# Patient Record
Sex: FEMALE | Race: OTHER | Employment: UNEMPLOYED | URBAN - METROPOLITAN AREA
[De-identification: names, ages, dates, MRNs, and addresses within clinical notes are randomized per-mention and may not be internally consistent; named-entity substitution may affect disease eponyms.]

---

## 2017-11-27 ENCOUNTER — OFFICE VISIT (OUTPATIENT)
Dept: ORTHOPEDIC SURGERY | Age: 57
End: 2017-11-27

## 2017-11-27 VITALS
HEART RATE: 68 BPM | OXYGEN SATURATION: 99 % | TEMPERATURE: 98.3 F | SYSTOLIC BLOOD PRESSURE: 125 MMHG | HEIGHT: 63 IN | BODY MASS INDEX: 23.57 KG/M2 | RESPIRATION RATE: 18 BRPM | WEIGHT: 133 LBS | DIASTOLIC BLOOD PRESSURE: 54 MMHG

## 2017-11-27 DIAGNOSIS — M79.2 NEURITIS: ICD-10-CM

## 2017-11-27 DIAGNOSIS — M50.30 DDD (DEGENERATIVE DISC DISEASE), CERVICAL: ICD-10-CM

## 2017-11-27 DIAGNOSIS — M54.2 NECK PAIN: Primary | ICD-10-CM

## 2017-11-27 DIAGNOSIS — M47.816 LUMBAR FACET ARTHROPATHY: ICD-10-CM

## 2017-11-27 DIAGNOSIS — M62.838 MUSCLE SPASM: ICD-10-CM

## 2017-11-27 DIAGNOSIS — M54.50 LUMBAR SPINE PAIN: ICD-10-CM

## 2017-11-27 DIAGNOSIS — M79.18 MYOFASCIAL PAIN: ICD-10-CM

## 2017-11-27 RX ORDER — IBUPROFEN 200 MG
TABLET ORAL
COMMUNITY
End: 2018-02-08 | Stop reason: ALTCHOICE

## 2017-11-27 RX ORDER — GABAPENTIN 300 MG/1
CAPSULE ORAL
Qty: 30 CAP | Refills: 1 | Status: SHIPPED | OUTPATIENT
Start: 2017-11-27 | End: 2018-02-08 | Stop reason: ALTCHOICE

## 2017-11-27 RX ORDER — IBUPROFEN 600 MG/1
TABLET ORAL
Qty: 60 TAB | Refills: 1 | Status: SHIPPED | OUTPATIENT
Start: 2017-11-27 | End: 2018-02-08 | Stop reason: ALTCHOICE

## 2017-11-27 NOTE — MR AVS SNAPSHOT
Visit Information Date & Time Provider Department Dept. Phone Encounter #  
 11/27/2017  9:00 AM Cookie Tavarez, 27 [facility] [phone] [id number] Follow-up Instructions Return in about 1 month (around 12/27/2017) for PT follow up. Routing History Upcoming Health Maintenance Date Due Hepatitis C Screening 1960 DTaP/Tdap/Td series (1 - Tdap) 7/23/1981 PAP AKA CERVICAL CYTOLOGY 7/23/1981 BREAST CANCER SCRN MAMMOGRAM 7/23/2010 FOBT Q 1 YEAR AGE 50-75 7/23/2010 Influenza Age 5 to Adult 8/1/2017 Allergies as of 11/27/2017  Review Complete On: 11/27/2017 By: Cookie Tavarez MD  
 No Known Allergies Current Immunizations  Reviewed on 9/15/2016 No immunizations on file. Not reviewed this visit You Were Diagnosed With   
  
 Codes Comments Neck pain    -  Primary ICD-10-CM: M54.2 ICD-9-CM: 723.1 Lumbar spine pain     ICD-10-CM: M54.5 ICD-9-CM: 724.2 DDD (degenerative disc disease), cervical     ICD-10-CM: M50.30 ICD-9-CM: 722.4 Muscle spasm     ICD-10-CM: D26.224 ICD-9-CM: 728.85 Myofascial pain     ICD-10-CM: M79.1 ICD-9-CM: 729.1 Lumbar facet arthropathy     ICD-10-CM: M12.88 ICD-9-CM: 721.3 Neuritis     ICD-10-CM: M79.2 ICD-9-CM: 729.2 Vitals BP Pulse Temp Resp Height(growth percentile) Weight(growth percentile) 125/54 68 98.3 °F (36.8 °C) 18 5' 2.5\" (1.588 m) 133 lb (60.3 kg) SpO2 BMI OB Status Smoking Status 99% 23.94 kg/m2 Unknown Never Smoker BMI and BSA Data Body Mass Index Body Surface Area  
 23.94 kg/m 2 1.63 m 2 Preferred Pharmacy Pharmacy Name Phone [address], [facility], [address] [place] [address] Avenue [phone] Your Updated Medication List  
  
   
This list is accurate as of: 11/27/17  9:56 AM.  Always use your most recent med list.  
  
  
  
  
 gabapentin 300 mg capsule Commonly known as:  NEURONTIN Take 1 in the evening after dinner  Indications: NEUROPATHIC PAIN  
  
 * ibuprofen 200 mg tablet Commonly known as:  MOTRIN Take  by mouth. * ibuprofen 600 mg tablet Commonly known as:  MOTRIN Take 1 po bid with food as needed for pain  Indications: Pain * Notice: This list has 2 medication(s) that are the same as other medications prescribed for you. Read the directions carefully, and ask your doctor or other care provider to review them with you. Prescriptions Sent to Pharmacy Refills  
 gabapentin (NEURONTIN) 300 mg capsule 1 Sig: Take 1 in the evening after dinner  Indications: NEUROPATHIC PAIN Class: Normal  
 Pharmacy: [address], [facility] , [address] [place] [address] Ph #: [phone]  
 ibuprofen (MOTRIN) 600 mg tablet 1 Sig: Take 1 po bid with food as needed for pain  Indications: Pain Class: Normal  
 Pharmacy: [address], [facility] , [address] [place] [address] Ph #: [phone] We Performed the Following AMB POC XRAY, SPINE, CERVICAL; 2 OR 3 [45535 CPT(R)] AMB POC XRAY, SPINE, LUMBOSACRAL; 4+ [id number] CPT(R)] REFERRAL TO PHYSICAL THERAPY [KBC51 Custom] Comments:  
 Evaluate and Treat Lumbar and Cervical Therapy 2-3 x week for 4 weeks Follow-up Instructions Return in about 1 month (around 12/27/2017) for PT follow up. Referral Information Referral ID Referred By Referred To  
  
 [id number] Renella Blizzard [facility]. [address], [address] [place], [address] [address] Phone: [phone] Visits Status Start Date End Date 1 New Request 11/27/17 11/27/18 If your referral has a status of pending review or denied, additional information will be sent to support the outcome of this decision. Patient Instructions Neck Arthritis: Exercises Your Care Instructions Here are some examples of typical rehabilitation exercises for your condition. Start each exercise slowly. Ease off the exercise if you start to have pain. Your doctor or physical therapist will tell you when you can start these exercises and which ones will work best for you. How to do the exercises Neck stretches to the side 1. This stretch works best if you keep your shoulder down as you lean away from it. To help you remember to do this, start by relaxing your shoulders and lightly holding on to your thighs or your chair. 2. Tilt your head toward your shoulder and hold for 15 to 30 seconds. Let the weight of your head stretch your muscles. 3. Repeat 2 to 4 times toward each shoulder. Chin tuck 1. Lie on the floor with a rolled-up towel under your neck. Your head should be touching the floor. 2. Slowly bring your chin toward your chest. 
3. Hold for a count of 6, and then relax for up to 10 seconds. 4. Repeat 8 to 12 times. Active cervical rotation 1. Sit in a firm chair, or stand up straight. 2. Keeping your chin level, turn your head to the right, and hold for 15 to 30 seconds. 3. Turn your head to the left and hold for 15 to 30 seconds. 4. Repeat 2 to 4 times to each side. Shoulder blade squeeze 1. While standing, squeeze your shoulder blades together. 2. Do not raise your shoulders up as you are squeezing. 3. Hold for 6 seconds. 4. Repeat 8 to 12 times. Shoulder rolls 1. Sit comfortably with your feet shoulder-width apart. You can also do this exercise standing up. 2. Roll your shoulders up, then back, and then down in a smooth, circular motion. 3. Repeat 2 to 4 times. Follow-up care is a key part of your treatment and safety. Be sure to make and go to all appointments, and call your doctor if you are having problems. It's also a good idea to know your test results and keep a list of the medicines you take. Where can you learn more? Go to http://bella-camila.info/. Enter J484 in the search box to learn more about \"Neck Arthritis: Exercises. \" Current as of: March 21, 2017 Content Version: 11.4 © 3421-6510 [employer]. Care instructions adapted under license by [employer] (which disclaims liability or warranty for this information). If you have questions about a medical condition or this instruction, always ask your healthcare professional. [place] [address] any warranty or liability for your use of this information. Low Back Arthritis: Exercises Your Care Instructions Here are some examples of typical rehabilitation exercises for your condition. Start each exercise slowly. Ease off the exercise if you start to have pain. Your doctor or physical therapist will tell you when you can start these exercises and which ones will work best for you. When you are not being active, find a comfortable position for rest. Some people are comfortable on the floor or a medium-firm bed with a small pillow under their head and another under their knees. Some people prefer to lie on their side with a pillow between their knees. Don't stay in one position for too long. Take short walks (10 to 20 minutes) every 2 to 3 hours. Avoid slopes, hills, and stairs until you feel better. Walk only distances you can manage without pain, especially leg pain. How to do the exercises Pelvic tilt 4. Lie on your back with your knees bent. 5. \"Brace\" your stomach-tighten your muscles by pulling in and imagining your belly button moving toward your spine. 6. Press your lower back into the floor. You should feel your hips and pelvis rock back. 7. Hold for 6 seconds while breathing smoothly. 8. Relax and allow your pelvis and hips to rock forward. 9. Repeat 8 to 12 times. Back stretches 5. Get down on your hands and knees on the floor. 6. Relax your head and allow it to droop. Round your back up toward the ceiling until you feel a nice stretch in your upper, middle, and lower back. Hold this stretch for as long as it feels comfortable, or about 15 to 30 seconds. 7. Return to the starting position with a flat back while you are on your hands and knees. 8. Let your back sway by pressing your stomach toward the floor. Lift your buttocks toward the ceiling. 9. Hold this position for 15 to 30 seconds. 10. Repeat 2 to 4 times. Follow-up care is a key part of your treatment and safety. Be sure to make and go to all appointments, and call your doctor if you are having problems. It's also a good idea to know your test results and keep a list of the medicines you take. Where can you learn more? Go to http://bella-camial.info/. Enter A054 in the search box to learn more about \"Low Back Arthritis: Exercises. \" Current as of: March 21, 2017 Content Version: 11.4 © 5817-1367 [employer]. Care instructions adapted under license by [employer] (which disclaims liability or warranty for this information). If you have questions about a medical condition or this instruction, always ask your healthcare professional. [address] any warranty or liability for your use of this information. Introducing [facility]! Dear Mike Kaene: Thank you for requesting a [employer] account. Our records indicate that you already have an active [employer] account. You can access your account anytime at [email or web address] Did you know that you can access your hospital and ER discharge instructions at any time in [employer]? You can also review all of your test results from your hospital stay or ER visit. Additional Information If you have questions, please visit the Frequently Asked Questions section of the [employer] website at [email or web address] Remember, MyChart is NOT to be used for urgent needs. For medical emergencies, dial 911. Now available from your iPhone and Android! Please provide this summary of care documentation to your next provider. Your primary care clinician is listed as Siena Fuchs. If you have any questions after today's visit, please call [phone].

## 2017-11-27 NOTE — PATIENT INSTRUCTIONS

## 2017-11-27 NOTE — PROGRESS NOTES
[facility]  [address] [place] [address], [facility] [address]  Phone: [phone]  Fax: [phone]    NEW PATIENT    ASSESSMENT   Diagnoses and all orders for this visit:    1. Neck pain  -     [04484] C Spine 2-3V  -     ibuprofen (MOTRIN) 600 mg tablet; Take 1 po bid with food as needed for pain  Indications: Pain  -     REFERRAL TO PHYSICAL THERAPY    2. Lumbar spine pain  -     [02828] LS Spine 4V  -     ibuprofen (MOTRIN) 600 mg tablet; Take 1 po bid with food as needed for pain  Indications: Pain  -     REFERRAL TO PHYSICAL THERAPY    3. DDD (degenerative disc disease), cervical  -     REFERRAL TO PHYSICAL THERAPY    4. Muscle spasm  -     REFERRAL TO PHYSICAL THERAPY    5. Myofascial pain  -     REFERRAL TO PHYSICAL THERAPY    6. Lumbar facet arthropathy  -     ibuprofen (MOTRIN) 600 mg tablet; Take 1 po bid with food as needed for pain  Indications: Pain  -     REFERRAL TO PHYSICAL THERAPY    7. Neuritis  -     gabapentin (NEURONTIN) 300 mg capsule; Take 1 in the evening after dinner  Indications: NEUROPATHIC PAIN  -     REFERRAL TO PHYSICAL THERAPY       IMPRESSION AND PLAN:  Elon Buerger is a 62 y.o. right hand dominant female with history of neck and lower back pain. She has experienced progressive neck pain for about 10 years and progressively lower back pain x 5 years. Pt experiences pain radiating down the right arm to the hand and down both legs to the feet. She has tried ibuprofen 400 mg QHS and has received chiropractic treatment. 1) Pt was given information on cervical and lumbar arthritis exercises. 2) She was referred to cervical and lumbar physical therapy. 3) Pt was prescribed ibuprofen 600 mg 1 tab BID prn pain. 4) She was prescribed Neurontin 300 mg 1 tab QHS. 5) Discussed obtaining a lumbar MRI if needed. 6) Ms. Virgie Grove has a reminder for a \"due or due soon\" health maintenance.  I have asked that she contact her primary care provider, Ziggy Abrams MD, for follow-up on this health maintenance. 7)  demonstrated consistency with prescribing. 8) Pt will follow-up in 4-6 weeks or sooner if needed. HISTORY OF PRESENT ILLNESS:  Anjel Liang is a 62 y.o. right hand dominant female with history of neck and lower back pain. Pt presents to the office today as a self referred new patient. She has experienced neck pain for about 10 years and admits that it has progressively worsened. 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 She admits to pain radiating down the right arm to the hand. Pt also admits to weakness in the arms but denies any leg weakness at this time. Pt complains of aching lower back pain x 5 years. Her lower back pain radiates down the right leg to the foot that is worse at night, interfering with her sleep. Pt admits that her lower back pain progressed and she has recently started experiencing aching/tingling pain radiating down the left leg to the foot. Her lower back and leg pain is worse when sitting. She has not tried physical therapy but has received chiropractic treatment. Pt states she has been using ibuprofen 200 mg 2 tabs QHS as needed with benefit. Pt at this time desires to proceed with physical therapy and medication evaluation. Of note, patient had a laparoscopic hysterectomy last year. She will start a new job for a [employer] in [place] this afternoon. Pain Scale: 8/10     PCP: Ziggy Abrams MD      History reviewed. No pertinent past medical history. Social History     Social History    Marital status:      Spouse name: N/A    Number of children: N/A    Years of education: N/A     Occupational History    Not on file.      Social History Main Topics    Smoking status: Never Smoker    Smokeless tobacco: Never Used    Alcohol use No    Drug use: No    Sexual activity: Not on file     Other Topics Concern  Not on file     Social History Narrative           No Known Allergies    REVIEW OF SYSTEMS    Constitutional: Negative for fever, chills, or weight change. Respiratory: Negative for cough or shortness of breath. Cardiovascular: Negative for chest pain or palpitations. Gastrointestinal: Negative for acid reflux, change in bowel habits, or constipation. Genitourinary: Negative for dysuria and flank pain. Musculoskeletal: Positive for cervical and lumbar pain. Skin: Negative for rash. Neurological: Negative for headaches, dizziness, or numbness. Endo/Heme/Allergies: Negative for increased bruising. Psychiatric/Behavioral: Positive for difficulty with sleep. PHYSICAL EXAMINATION  Visit Vitals    /54    Pulse 68    Temp 98.3 °F (36.8 °C)    Resp 18    Ht 5' 2.5\" (1.588 m)    Wt 133 lb (60.3 kg)    SpO2 99%    BMI 23.94 kg/m2       Constitutional: Awake, alert, and in no acute distress. HEENT: Normocephalic. Atraumatic. Oropharynx is moist and clear. PERRL. EOMI. Sclerae are nonicteric  Heart: Regular rate and rhythm  Lungs: Clear to auscultation bilaterally  Abdomen: Soft and nontender. Bowel sounds are present  Neurological: 1+ symmetrical DTRs in the upper extremities. 1+ symmetrical DTRs in the lower extremities. Sensation to light touch is intact. Negative Arlette's sign bilaterally. Skin: warm, dry, and intact. Musculoskeletal: Decreased range of motion and pain with side to side cervical flexion. Good range of motion with cervical extension and forward flexion. Very tight across the upper trapezius with multiple trigger points. Minimal tenderness to palpation in the lower lumbar region and over the sacroiliac joints. No pain with extension, axial loading, or forward flexion. Mild pain with internal rotation of her right hip. Positive straight leg raise on the right.       Biceps  Triceps Deltoids Wrist Ext Wrist Flex Hand Intrin   Right 4/5 4/5 4/5 4/5 4/5 4/5   Left +4/5 +4/5 +4/5 +4/5 +4/5 +4/5      Hip Flex  Quads Hamstrings Ankle DF EHL Ankle PF   Right 4/5 4/5 4/5 4/5 4/5 4/5   Left +4/5 +4/5 +4/5 +4/5 +4/5 +4/5     IMAGING:    Cervical spine 2V x-rays from 11/27/2017 were personally reviewed and demonstrated:  Degenerative discs at C4, C5, and C6. Degenerative facets. Lumbar spine 4V x-rays from 11/27/2017 were personally reviewed and demonstrated:  Mild degenerative joint findings in both hips. Multilevel degenerative facets. Mild degenerative disc disease. Lithesis at L5-S1    Written by Adeel Brown, as dictated by Tyson Espinosa MD.  I, Dr. Tyson Espinosa confirm that all documentation is accurate.

## 2017-12-11 ENCOUNTER — HOSPITAL ENCOUNTER (OUTPATIENT)
Dept: PHYSICAL THERAPY | Age: 57
Discharge: HOME | End: 2017-12-11
Payer: COMMERCIAL

## 2017-12-11 PROCEDURE — 97110 THERAPEUTIC EXERCISES: CPT

## 2017-12-11 PROCEDURE — 97161 PT EVAL LOW COMPLEX 20 MIN: CPT

## 2017-12-11 NOTE — PROGRESS NOTES
[facility]  [address]  [place], [address]  [phone] fax    Plan of Care/ Statement of Necessity for Physical Therapy Services    Patient name: Jaky Ye Start of Care: 2017   Referral source: Lila Rasmussen MD : 1960    Medical Diagnosis: Low back pain [M54.5]  Neck pain [M54.2]   Onset Date:10 years ago cervical, 5 years ago lumbar    Treatment Diagnosis: Mechanical cervical/lumbar pain   Prior Hospitalization: see medical history Provider#: [id number]   Medications: Verified on Patient summary List    Comorbidities:    Prior Level of Function: The patient states that she has had pain with prolonged sitting and sleeping at night with her low back. The Plan of Care and following information is based on the information from the initial evaluation. Assessment/ key information: The patient is a 62year old right handed female with a chief complaint of cervical pain and lumbar pain which are both chronic in nature. She states that her neck has improved recently since she has avoided sleeping on her stomach per her chiropractor and has been sleeping with a pillow which wraps around her neck. She states her back and pain that extends down her right leg is most bothersome currently which is painful during prolonged sitting and occasionally at night. The patient has had recent radiographs of her neck and back, but is unsure of specific results. She presents with signs and symptoms consistent with mechanical neck pain with negative Spurlings, compression testing, dermatomal, and myotomal testing, however she does present with decreased dural mobility and positive SLR and slump testing, but negative myotomal and reflex testing. She has impairments consisting of pain, decreased ROM, decreased flexibility, decreased core and scap strength, decreased ADL efficiency, and limited ADL ease.  The patient will benefit from skilled PT in order to address the aforementioned impairments. Evaluation Complexity History LOW Complexity : Zero comorbidities / personal factors that will impact the outcome / POC; Examination MEDIUM Complexity : 3 Standardized tests and measures addressing body structure, function, activity limitation and / or participation in recreation  ;Presentation MEDIUM Complexity : Evolving with changing characteristics  ; Clinical Decision Making MEDIUM Complexity : FOTO score of 26-74  Overall Complexity Rating: LOW   Problem List: pain affecting function, decrease ROM, decrease strength, decrease ADL/ functional abilitiies and decrease activity tolerance   Treatment Plan may include any combination of the following: Therapeutic exercise, Therapeutic activities, Neuromuscular re-education, Physical agent/modality, Manual therapy and Patient education  Patient / Family readiness to learn indicated by: asking questions, trying to perform skills and interest  Persons(s) to be included in education: patient (P)  Barriers to Learning/Limitations: None  Patient Goal (s): stop the problem  Patient Self Reported Health Status: good  Rehabilitation Potential: good    Short Term Goals: To be accomplished in 2 weeks:   1. The patient will be independent and compliant with HEP to maximize therapeutic benefit. 2. The patient will improve cervical rotation to 55 degrees bilaterally to improve ease of driving. Long Term Goals: To be accomplished in 4 weeks:    1. The patient will improve lumbar FOTO score to 71 to improve ADL ease. 2. The patient will improve cervical FOTO score to 60 to maximize ADL ease. 3. The patient will present with dural 90/90 HS mobility of 30 degrees to improve ease of chores. 4. The patient will report abolishment of R LE pain to improve ease of occupational tasks. 5. The patient will improve cervical chin tuck and lift to 30\" without signs of fatigue to improve ease of sitting.     Frequency / Duration: Patient to be seen 2 times per week for 4 weeks. Patient/ Caregiver education and instruction: Diagnosis, prognosis, self care, activity modification and exercises   [x]  Plan of care has been reviewed with ALESHIA Queen, PT 12/11/2017 9:50 AM    ________________________________________________________________________    I certify that the above Therapy Services are being furnished while the patient is under my care. I agree with the treatment plan and certify that this therapy is necessary.     [de-identified] Signature:____________________  Date:____________Time: _________    Please sign and return to [facility] [address] [place] [address], [facility] [address]  [phone] fax

## 2017-12-11 NOTE — PROGRESS NOTES
PT DAILY TREATMENT NOTE     Patient Name: Flower Lan  Date:2017  : 1960  [x]  Patient  Verified  Payor: Rene Elizondo / Plan: [employer] / Product Type: HMO /    In time:9:08  Out time:9:50  Total Treatment Time (min): 42  Visit #: 1 of 8    Treatment Area: Low back pain [M54.5]  Neck pain [M54.2]    SUBJECTIVE  Pain Level (0-10 scale): 2/10  Any medication changes, allergies to medications, adverse drug reactions, diagnosis change, or new procedure performed?: [x] No    [] Yes (see summary sheet for update)  Subjective functional status/changes:   [] No changes reported  The patient has a chief complaint of cervical pain with occasional R UE pain, and LBP with more consistent R LE pain that is chronic but gradually increasing lately. OBJECTIVE  32 min [x]Eval                  []Re-Eval       10 min Therapeutic Exercise:  [] See flow sheet :   Rationale: increase ROM and increase strength to improve the patients ability to improve ADL ease. With   [] TE   [] TA   [] neuro   [] other: Patient Education: [x] Review HEP    [] Progressed/Changed HEP based on:   [] positioning   [] body mechanics   [] transfers   [] heat/ice application    [] other:      Other Objective/Functional Measures: See IE     Pain Level (0-10 scale) post treatment: 210    ASSESSMENT/Changes in Function: See POC. Patient will continue to benefit from skilled PT services to modify and progress therapeutic interventions, address functional mobility deficits, address ROM deficits, address strength deficits, analyze and address soft tissue restrictions, analyze and cue movement patterns, analyze and modify body mechanics/ergonomics, assess and modify postural abnormalities and instruct in home and community integration to attain remaining goals.      [x]  See Plan of Care  []  See progress note/recertification  []  See Discharge Summary         Progress towards goals / Updated goals:  Short Term Goals: To be accomplished in 2 weeks:                         1. The patient will be independent and compliant with HEP to maximize therapeutic benefit. 2. The patient will improve cervical rotation to 55 degrees bilaterally to improve ease of driving. Long Term Goals: To be accomplished in 4 weeks:                          1. The patient will improve lumbar FOTO score to 71 to improve ADL ease. 2. The patient will improve cervical FOTO score to 60 to maximize ADL ease. 3. The patient will present with dural 90/90 HS mobility of 30 degrees to improve ease of chores. 4. The patient will report abolishment of R LE pain to improve ease of occupational tasks. 5. The patient will improve cervical chin tuck and lift to 30\" without signs of fatigue to improve ease of sitting.     PLAN  []  Upgrade activities as tolerated     [x]  Continue plan of care  []  Update interventions per flow sheet       []  Discharge due to:_  []  Other:_      Kents Store Postal, PT 12/11/2017  10:27 AM    Future Appointments  Date Time Provider Alon Barragani   12/15/2017 2:00 PM Skinny Zimmerman [facility]   12/18/2017 9:00 AM Lynne Christianio [facility]   12/22/2017 1:00 PM Lynneramsey Christianio [facility]   12/27/2017 9:30 AM Scot Allen [facility]   12/29/2017 1:00 PM Lynne Allen [facility]   1/2/2018 10:00 AM Kents Store Postal, PT [facility]   1/4/2018 3:00 PM Kents Store Postal, PT [facility]   1/10/2018 8:30 AM Prachi Page  [address]

## 2017-12-15 ENCOUNTER — APPOINTMENT (OUTPATIENT)
Dept: PHYSICAL THERAPY | Age: 57
End: 2017-12-15
Payer: COMMERCIAL

## 2017-12-18 ENCOUNTER — HOSPITAL ENCOUNTER (OUTPATIENT)
Dept: PHYSICAL THERAPY | Age: 57
Discharge: HOME | End: 2017-12-18
Payer: COMMERCIAL

## 2017-12-18 PROCEDURE — 97112 NEUROMUSCULAR REEDUCATION: CPT

## 2017-12-18 PROCEDURE — 97110 THERAPEUTIC EXERCISES: CPT

## 2017-12-18 NOTE — PROGRESS NOTES
PT DAILY TREATMENT NOTE 3-16    Patient Name: Serenity Mccullough  Date:2017  : 1960  [x]  Patient  Verified  Payor: Pillo Swift / Plan: [employer] / Product Type: HMO /    In time:9:08  Out time:9:42  Total Treatment Time (min): 34  Visit #: 2 of 8    Treatment Area: Low back pain [M54.5]  Neck pain [M54.2]    SUBJECTIVE  Pain Level (0-10 scale): 6  Any medication changes, allergies to medications, adverse drug reactions, diagnosis change, or new procedure performed?: [x] No    [] Yes (see summary sheet for update)  Subjective functional status/changes:   [] No changes reported  \"I get dizzy when I'm washing my windows. I get dizzy everytime I exercise. I have been dealing with this for years. \" Patient moved to this area in past two years, therapist encouraging patient to find primary care doctor. OBJECTIVE  24 min Therapeutic Exercise:  [x] See flow sheet :   Rationale: increase ROM, increase strength and improve coordination to improve the patients ability to increase ease with ADLs    10 min Neuromuscular Re-education:  [x]  See flow sheet :   Rationale: increase strength, improve coordination, improve balance and increase proprioception  to improve the patients ability to perform functional activities with increased ease              With   [] TE   [] TA   [] neuro   [] other: Patient Education: [x] Review HEP    [] Progressed/Changed HEP based on:   [] positioning   [] body mechanics   [] transfers   [] heat/ice application    [] other:      Other Objective/Functional Measures:   Initiated POC per flowsheet    Patient with many c/o dizziness throughout session. Negative beatrice-hallpike. Cues to maintain SA activation with QP position and avoid dumping into upper c/s extension    Pain Level (0-10 scale) post treatment: 3    ASSESSMENT/Changes in Function:   Patient with multiple c/o dizziness throughout session.  She informs therapist that she has been dealing with dizziness for years, yet has not been officially diagnosed. She reports that dizziness occurs when working out or when performing household chores as well as getting out of bed, but, states, \"I push through. I'm used to it. \" Patient with some difficulty following therapist's instructions secondary to English is secondary language. Continue per POC per patient tolerance. Patient will continue to benefit from skilled PT services to modify and progress therapeutic interventions, address functional mobility deficits, address ROM deficits, address strength deficits, analyze and address soft tissue restrictions, analyze and cue movement patterns, analyze and modify body mechanics/ergonomics and assess and modify postural abnormalities to attain remaining goals. []  See Plan of Care  []  See progress note/recertification  []  See Discharge Summary         Short Term Goals: To be accomplished in 2 weeks:                         1. The patient will be independent and compliant with HEP to maximize therapeutic benefit. 2. The patient will improve cervical rotation to 55 degrees bilaterally to improve ease of driving. Long Term Goals: To be accomplished in 4 weeks:                          1. The patient will improve lumbar FOTO score to 71 to improve ADL ease. 2. The patient will improve cervical FOTO score to 60 to maximize ADL ease. 3. The patient will present with dural 90/90 HS mobility of 30 degrees to improve ease of chores. 4. The patient will report abolishment of R LE pain to improve ease of occupational tasks. 5. The patient will improve cervical chin tuck and lift to 30\" without signs of fatigue to improve ease of sitting.     PLAN  []  Upgrade activities as tolerated     [x]  Continue plan of care  []  Update interventions per flow sheet       []  Discharge due to:_  []  Other:_ Negin Hemp 12/18/2017  9:22 AM    Future Appointments  Date Time Provider Alon Parson   12/22/2017 1:00 PM Negin Hemp [facility]   12/27/2017 9:30 AM Negin Hemp [facility]   12/29/2017 1:00 PM Negin Hemp [facility]   1/2/2018 10:00 AM Milo Beckman, PT [facility]   1/4/2018 3:00 PM Milo Beckman, PT [facility]   1/10/2018 8:30 AM [facility]eli J[facility]

## 2017-12-22 ENCOUNTER — HOSPITAL ENCOUNTER (OUTPATIENT)
Dept: PHYSICAL THERAPY | Age: 57
Discharge: HOME | End: 2017-12-22
Payer: COMMERCIAL

## 2017-12-22 PROCEDURE — 97110 THERAPEUTIC EXERCISES: CPT

## 2017-12-22 PROCEDURE — 97112 NEUROMUSCULAR REEDUCATION: CPT

## 2017-12-22 NOTE — PROGRESS NOTES
PT DAILY TREATMENT NOTE 3-16    Patient Name: Steve Abarca  Date:2017  : 1960  [x]  Patient  Verified  Payor: [employer] / Plan: [address]MO / Product Type: HMO /    In time:1:05  Out time:1:44  Total Treatment Time (min): 39  Visit #: 3 of 8    Treatment Area: Low back pain [M54.5]  Neck pain [M54.2]    SUBJECTIVE  Pain Level (0-10 scale): 1  Any medication changes, allergies to medications, adverse drug reactions, diagnosis change, or new procedure performed?: [x] No    [] Yes (see summary sheet for update)  Subjective functional status/changes:   [] No changes reported  \"I have been under a lot of stress because my  has had an infection in his leg, and he had to go to the hospital. He was in the hospital for two years. \"    Patient continues to have intermittent c/o \"dizziness\". OBJECTIVE  31 min Therapeutic Exercise:  [x] See flow sheet :   Rationale: increase ROM, increase strength and improve coordination to improve the patients ability to increase ease with ADLs    8 min Neuromuscular Re-education:  [x]  See flow sheet :   Rationale: increase ROM, increase strength, improve coordination, improve balance and increase proprioception  to improve the patients ability to improve c/s posture            With   [] TE   [] TA   [] neuro   [] other: Patient Education: [x] Review HEP    [] Progressed/Changed HEP based on:   [] positioning   [] body mechanics   [] transfers   [] heat/ice application    [] other:      Other Objective/Functional Measures:   BP: 130/90 mmHg     Oculomotor Tests: (Fixation Not Blocked)       Ocular ROM:   [x] [facility]    [] Limited    Describe:       Spontaneous Nystag. [x] Neg     [] Pos    [] Left    [] Right       Gaze Holding Nystag.  [x] Neg     [] Pos    [] Left    [] Right        Smooth Pursuit  [x] Neg     [] Pos    [] Left    [] Right        Saccades   [x] Neg     [] Pos    [] Left    [] Right        VOR - Slow Head Mvmt [x] Neg     [] Pos    [] Left    [] Right        VOR - Fast Head Mvmt [x] Neg     [] Pos    [] Left    [] Right However, did reproduce dizziness       Head Thrust  [x] Neg     [] Pos    [] Left    [] Right However, did reproduce dizziness     The patient has a component of activity and stress that she reports does provocate dizziness. She does have reports of tinnitus as well. Advised patient that she should follow up with ENT or Neurologist for these reports as she indicates she has not had this complaint assessed in years. I did indicate to the patient we can continue to treat unless her condition worsens or limits her ability to engage in exercise. Bhargavi Resendez, DPT, PT, OCS      Pain Level (0-10 scale) post treatment: 0    ASSESSMENT/Changes in Function:   Patient continues to report intermittent dizziness which subsides post rest breaks. Patient with quick c/s postural fatigue as evidenced with QP activities. Continued patient education to follow up with MD in regards to her dizzy symptoms. Patient will continue to benefit from skilled PT services to modify and progress therapeutic interventions, address functional mobility deficits, address ROM deficits, address strength deficits, analyze and address soft tissue restrictions, analyze and cue movement patterns, analyze and modify body mechanics/ergonomics and assess and modify postural abnormalities to attain remaining goals. []  See Plan of Care  []  See progress note/recertification  []  See Discharge Summary         Short Term Goals: To be accomplished in 2 weeks:                         2. The patient will be independent and compliant with HEP to maximize therapeutic benefit.progressing per patient report (12/22/2017)                         2. The patient will improve cervical rotation to 55 degrees bilaterally to improve ease of driving. Long Term Goals: To be accomplished in 4 weeks:                          1.  The patient will improve lumbar FOTO score to 71 to improve ADL ease.                         2. The patient will improve cervical FOTO score to 60 to maximize ADL ease.                         3. The patient will present with dural 90/90 HS mobility of 30 degrees to improve ease of chores.                         4. The patient will report abolishment of R LE pain to improve ease of occupational tasks.                         5. The patient will improve cervical chin tuck and lift to 30\" without signs of fatigue to improve ease of sitting.     PLAN  []  Upgrade activities as tolerated     [x]  Continue plan of care  []  Update interventions per flow sheet       []  Discharge due to:_  []  Other:_      Nita Gear 12/22/2017  1:14 PM    Future Appointments  Date Time Provider Alon Blank   12/29/2017 10:30 AM Nita Becky [facility]   1/2/2018 10:00 AM Mackenzie Oconnell, PT [facility]   1/4/2018 9:30 AM Capo Marin PTA [facility]   1/8/2018 10:00 AM Mackenzie Oconnell, PT [facility]   1/10/2018 8:30 AM Veronica Cantor  [address]

## 2017-12-27 ENCOUNTER — APPOINTMENT (OUTPATIENT)
Dept: PHYSICAL THERAPY | Age: 57
End: 2017-12-27
Payer: COMMERCIAL

## 2017-12-29 ENCOUNTER — HOSPITAL ENCOUNTER (OUTPATIENT)
Dept: PHYSICAL THERAPY | Age: 57
Discharge: HOME | End: 2017-12-29
Payer: COMMERCIAL

## 2017-12-29 PROCEDURE — 97110 THERAPEUTIC EXERCISES: CPT

## 2017-12-29 PROCEDURE — 97112 NEUROMUSCULAR REEDUCATION: CPT

## 2017-12-29 NOTE — PROGRESS NOTES
PT DAILY TREATMENT NOTE 3-16    Patient Name: Jaylene Hou  Date:2017  : 1960  [x]  Patient  Verified  Payor: Marylou Jhon / Plan: [employer] / Product Type: HMO /    In time:10:30  Out time:11:00  Total Treatment Time (min): 30  Visit #: 4 of 8    Treatment Area: Low back pain [M54.5]  Neck pain [M54.2]    SUBJECTIVE  Pain Level (0-10 scale): 0  Any medication changes, allergies to medications, adverse drug reactions, diagnosis change, or new procedure performed?: [x] No    [] Yes (see summary sheet for update)  Subjective functional status/changes:   [] No changes reported  \"I'm feeling better since starting therapy. My neck and my back that is. \" Patient continues to report episodes of dizziness. OBJECTIVE  22 min Therapeutic Exercise:  [x] See flow sheet :   Rationale: increase ROM, increase strength and improve coordination to improve the patients ability to increase ease with ADLs    8 min Neuromuscular Re-education:  [x]  See flow sheet :   Rationale: increase ROM, increase strength, improve coordination and increase proprioception  to improve the patients ability to improve c/s posture            With   [] TE   [] TA   [] neuro   [] other: Patient Education: [x] Review HEP    [] Progressed/Changed HEP based on:   [] positioning   [] body mechanics   [] transfers   [] heat/ice application    [] other:      Other Objective/Functional Measures:   C/s Rotation: R, 56 degrees. L, 50 degrees. Pain Level (0-10 scale) post treatment: 0    ASSESSMENT/Changes in Function:   Patient making progress towards initial goals. Cervical spine ROM is improving and she reports subjective improvement with no c/o pain today. Continues to report episodes of dizziness though none reported during session.     Patient will continue to benefit from skilled PT services to modify and progress therapeutic interventions, address functional mobility deficits, address ROM deficits, address strength deficits, analyze and address soft tissue restrictions, analyze and cue movement patterns, analyze and modify body mechanics/ergonomics and assess and modify postural abnormalities to attain remaining goals. []  See Plan of Care  []  See progress note/recertification  []  See Discharge Summary         Short Term Goals: To be accomplished in 2 weeks:                         4. The patient will be independent and compliant with HEP to maximize therapeutic benefit.progressing per patient report (12/22/2017)                         2. The patient will improve cervical rotation to 55 degrees bilaterally to improve ease of driving.-progressing, R, 56 degrees. L, 50 degrees (12/29/2017)  Long Term Goals: To be accomplished in 4 weeks:                          1. The patient will improve lumbar FOTO score to 71 to improve ADL ease.                         2. The patient will improve cervical FOTO score to 60 to maximize ADL ease.                         3. The patient will present with dural 90/90 HS mobility of 30 degrees to improve ease of chores.                         4. The patient will report abolishment of R LE pain to improve ease of occupational tasks.                         5. The patient will improve cervical chin tuck and lift to 30\" without signs of fatigue to improve ease of sitting.     PLAN  []  Upgrade activities as tolerated     [x]  Continue plan of care  []  Update interventions per flow sheet       []  Discharge due to:_  []  Other:_      Dulce Hernandez 12/29/2017  10:29 AM    Future Appointments  Date Time Provider Alon Parson   12/29/2017 10:30 AM Dulce Hernandez [facility]   1/2/2018 10:00 AM Nereyda Ramsay PT [facility]   1/4/2018 9:30 AM Brain Mendieta PTA [facility]   1/8/2018 10:00 AM Nereyda Ramsay PT [facility]   1/10/2018 8:30 AM Carola Lambert  [address]

## 2018-01-02 ENCOUNTER — HOSPITAL ENCOUNTER (OUTPATIENT)
Dept: PHYSICAL THERAPY | Age: 58
Discharge: HOME | End: 2018-01-02
Payer: COMMERCIAL

## 2018-01-02 PROCEDURE — 97112 NEUROMUSCULAR REEDUCATION: CPT

## 2018-01-02 PROCEDURE — 97110 THERAPEUTIC EXERCISES: CPT

## 2018-01-02 NOTE — PROGRESS NOTES
[facility]  [address]  [place], [address]  [phone] fax    Physical Therapy Discharge Summary  Patient name: Arcadio Cain Start of Care: 2017   Referral source: Jae Lott MD : 1960                          Medical Diagnosis: Low back pain [M54.5]  Neck pain [M54.2] Onset Date:10 years ago cervical, 5 years ago lumbar                          Treatment Diagnosis: Mechanical cervical/lumbar pain   Prior Hospitalization: see medical history Provider#: [id number]   Medications: Verified on Patient summary List    Comorbidities:    Prior Level of Function: The patient states that she has had pain with prolonged sitting and sleeping at night with her low back. Visits from Start of Care: 5    Missed Visits: 0  Reporting Period : 2017 to 2018    Summary of Care:  Short Term Goals: To be accomplished in 2 weeks:                         1. The patient will be independent and compliant with HEP to maximize therapeutic benefit.progressing per patient report (2017)                         2. The patient will improve cervical rotation to 55 degrees bilaterally to improve ease of driving.-progressing, R, 56 degrees. L, 50 degrees (2017)  Long Term Goals: To be accomplished in 4 weeks:                          1. The patient will improve lumbar FOTO score to 71 to improve ADL ease. Met 77 2018                         2. The patient will improve cervical FOTO score to 60 to maximize ADL ease. Met - 65 2018                         3. The patient will present with dural 90/90 HS mobility of 30 degrees to improve ease of chores. Met 20 degrees 2018                         4. The patient will report abolishment of R LE pain to improve ease of occupational tasks. Met no pain 2018                         5.  The patient will improve cervical chin tuck and lift to 30\" without signs of fatigue to improve ease of sitting. Met 60\" 1/02/2018    The patient has made excellent progress with regards to FOTO score, HS flexibility, R LE pain as well as cervical mobility. She has been discharged to continue HEP. Recommended to patient to see neuro or ENT regarding dizziness reports.   ASSESSMENT/RECOMMENDATIONS:  [x]Discontinue therapy: [x]Patient has reached or is progressing toward set goals      []Patient is non-compliant or has abdicated      []Due to lack of appreciable progress towards set [address], PT 1/2/2018 1:38 PM

## 2018-01-02 NOTE — PROGRESS NOTES
PT DAILY TREATMENT NOTE     Patient Name: Kassandra Keller  Date:2018  : 1960  [x]  Patient  Verified  Payor: Pippa Hanleyvis / Plan: [employer] / Product Type: HMO /    In time:10:00  Out time:10:34  Total Treatment Time (min): 34  Visit #: 5 of 8    Treatment Area: Low back pain [M54.5]  Neck pain [M54.2]    SUBJECTIVE  Pain Level (0-10 scale): 0/10  Any medication changes, allergies to medications, adverse drug reactions, diagnosis change, or new procedure performed?: [x] No    [] Yes (see summary sheet for update)  Subjective functional status/changes:   [] No changes reported  The patient states that she is feeling really good and notes no pain or issue upon arrival. The patient wonders if she can be discharged following today's visit. She does indicate that her right leg pain is not occurring anymore, occasional numbness in her right hip. She reports that she does continue to get dizziness. OBJECTIVE  24 min Therapeutic Exercise:  [x] See flow sheet :   Rationale: increase ROM and increase strength to improve the patients ability to improve ADL ease. 10 min Neuromuscular Re-education:  []  See flow sheet :   Rationale: increase ROM and increase strength  to improve the patients ability to improve ADL ease. min Manual Therapy:     Rationale:  to        With   [] TE   [] TA   [] neuro   [] other: Patient Education: [x] Review HEP    [] Progressed/Changed HEP based on:   [] positioning   [] body mechanics   [] transfers   [] heat/ice application    [] other:      Other Objective/Functional Measures:     Cervical rotation > 55 degrees bilaterally  20 degrees HS flexibility B  FOTO 65 cervical, 77 lumbar     Pain Level (0-10 scale) post treatment: 0/10    ASSESSMENT/Changes in Function: The patient has made excellent progress with regards to FOTO score, HS flexibility, R LE pain as well as cervical mobility. She has been discharged to continue HEP.  Recommended to patient to see neuro or ENT regarding dizziness reports. Patient will continue to benefit from skilled PT services to modify and progress therapeutic interventions, address functional mobility deficits, address ROM deficits, address strength deficits, analyze and address soft tissue restrictions, analyze and cue movement patterns, analyze and modify body mechanics/ergonomics, assess and modify postural abnormalities and instruct in home and community integration to attain remaining goals. []  See Plan of Care  []  See progress note/recertification  []  See Discharge Summary         Progress towards goals / Updated goals:  Short Term Goals: To be accomplished in 2 weeks:                         6. The patient will be independent and compliant with HEP to maximize therapeutic benefit.progressing per patient report (12/22/2017)                         2. The patient will improve cervical rotation to 55 degrees bilaterally to improve ease of driving.-progressing, R, 56 degrees. L, 50 degrees (12/29/2017)  Long Term Goals: To be accomplished in 4 weeks:                          1. The patient will improve lumbar FOTO score to 71 to improve ADL ease. Met 77 1/02/2018                         2. The patient will improve cervical FOTO score to 60 to maximize ADL ease. Met - 65 1/02/2018                         3. The patient will present with dural 90/90 HS mobility of 30 degrees to improve ease of chores. Met 20 degrees 1/02/2018                         4. The patient will report abolishment of R LE pain to improve ease of occupational tasks. Met no pain 1/02/2018                         5. The patient will improve cervical chin tuck and lift to 30\" without signs of fatigue to improve ease of sitting.  Met 60\" 1/02/2018    PLAN  []  Upgrade activities as tolerated     []  Continue plan of care  []  Update interventions per flow sheet       [x]  Discharge   []  Other:_      Krystin Brewer, PT 1/2/2018  10:15 AM    Future Appointments  Date Time Provider Alon Parson   1/4/2018 9:30 AM Juliana Abrams PTA [facility]   1/8/2018 10:00 AM Geovanna Neil PT [facility]   1/10/2018 8:30 AM Patty Hernandez  [address]

## 2018-01-03 ENCOUNTER — APPOINTMENT (OUTPATIENT)
Dept: PHYSICAL THERAPY | Age: 58
End: 2018-01-03
Payer: COMMERCIAL

## 2018-01-04 ENCOUNTER — APPOINTMENT (OUTPATIENT)
Dept: PHYSICAL THERAPY | Age: 58
End: 2018-01-04
Payer: COMMERCIAL

## 2018-01-05 ENCOUNTER — APPOINTMENT (OUTPATIENT)
Dept: PHYSICAL THERAPY | Age: 58
End: 2018-01-05
Payer: COMMERCIAL

## 2018-01-08 ENCOUNTER — APPOINTMENT (OUTPATIENT)
Dept: PHYSICAL THERAPY | Age: 58
End: 2018-01-08
Payer: COMMERCIAL

## 2018-01-10 ENCOUNTER — OFFICE VISIT (OUTPATIENT)
Dept: ORTHOPEDIC SURGERY | Age: 58
End: 2018-01-10

## 2018-01-10 VITALS
OXYGEN SATURATION: 99 % | WEIGHT: 133 LBS | HEART RATE: 76 BPM | SYSTOLIC BLOOD PRESSURE: 122 MMHG | RESPIRATION RATE: 18 BRPM | BODY MASS INDEX: 23.57 KG/M2 | TEMPERATURE: 98.3 F | DIASTOLIC BLOOD PRESSURE: 65 MMHG | HEIGHT: 63 IN

## 2018-01-10 DIAGNOSIS — M79.18 MYOFASCIAL MUSCLE PAIN: ICD-10-CM

## 2018-01-10 DIAGNOSIS — M50.30 DDD (DEGENERATIVE DISC DISEASE), CERVICAL: Primary | ICD-10-CM

## 2018-01-10 DIAGNOSIS — M62.838 MUSCLE SPASM: ICD-10-CM

## 2018-01-10 NOTE — PROGRESS NOTES
[facility]  [address], [address]  Phone: [phone]  Fax: [phone]      ASSESSMENT   Diagnoses and all orders for this visit:    1. DDD (degenerative disc disease), cervical    2. Myofascial muscle pain    3. Muscle spasm         IMPRESSION AND PLAN:  Harsh Moran is a 62 y.o. right hand dominant female with history of cervical and lumbar pain. Pt reports that she no longer experiences right arm pain or numbness and complains of 1-2/10 pain in the neck at this time. She experienced significant improvement with physical therapy and takes ibuprofen as her pain warrants. 1) Pt was given information on cervical arthritis exercises. 2) She will continue taking ibuprofen 600 mg as prescribed and does not need a refill at this time. 3) Pt was given contact information for PCP's in this area. 4) I recommended the patient try moist heat. 5) Pending progressive symptoms, I will consider ordering a cervical MRI. 6) Ms. Flo Glass has a reminder for a \"due or due soon\" health maintenance. I have asked that she contact her primary care provider, Elder MD Marlene, for follow-up on this health maintenance. 7)  demonstrated consistency with prescribing. 8) Pt will follow-up in 2 months or sooner if needed. HISTORY OF PRESENT ILLNESS:  Harsh Moran is a 62 y.o. right hand dominant female with history of cervical and lumbar pain. Pt reports that she no longer experiences right arm pain or numbness and complains of 1-2/10 pain in the neck at this time She tried cervical and lumbar physical therapy at [facility] since her last office visit with significant improvement. Pt denies any weakness in the arms or dropping objects. She tried cervical traction with benefit. Pt states that she has experienced about 75% improvement since her last office visit.  She admits to experiencing dizziness and nausea when turning her head in certain directs. Pt has experienced the dizziness for some time but reports that it worsened when she was experiencing severe neck pain. She plans to follow up with a neurologist in the near future regarding her dizziness. Pt reports that her lower back pain returns when she sits on her couch. She occasionally takes ibuprofen 600 mg and states that she has not taken the medication in about 2 weeks. Pt was prescribed Neurontin 300 mg but did not try the medication since she was hesitant about the side effects. She at this time desires to continue with current care. Of note, patient does not have a PCP at this time and is looking for a provider that is fluent in [facility] [place] Pain Scale: 1/10    PCP: Tello Jamil MD       History reviewed. No pertinent past medical history. Social History     Social History    Marital status:      Spouse name: N/A    Number of children: N/A    Years of education: N/A     Occupational History    Not on file. Social History Main Topics    Smoking status: Never Smoker    Smokeless tobacco: Never Used    Alcohol use No    Drug use: No    Sexual activity: Not on file     Other Topics Concern    Not on file     Social History Narrative       Current Outpatient Prescriptions   Medication Sig Dispense Refill    ibuprofen (MOTRIN) 200 mg tablet Take  by mouth.  ibuprofen (MOTRIN) 600 mg tablet Take 1 po bid with food as needed for pain  Indications: Pain 60 Tab 1    gabapentin (NEURONTIN) 300 mg capsule Take 1 in the evening after dinner  Indications: NEUROPATHIC PAIN 30 Cap 1       No Known Allergies      REVIEW OF SYSTEMS    Constitutional: Negative for fever, chills, or weight change. Respiratory: Negative for cough or shortness of breath. Cardiovascular: Negative for chest pain or palpitations. Gastrointestinal: Negative for acid reflux, change in bowel habits, or constipation. Genitourinary: Negative for dysuria and flank pain. Musculoskeletal: Positive for cervical pain. Skin: Negative for rash. Neurological: Negative for headaches, dizziness, or numbness. Endo/Heme/Allergies: Negative for increased bruising. Psychiatric/Behavioral: Negative for difficulty with sleep. PHYSICAL EXAMINATION  Visit Vitals    /65    Pulse 76    Temp 98.3 °F (36.8 °C)    Resp 18    Ht 5' 2.5\" (1.588 m)    Wt 133 lb (60.3 kg)    SpO2 99%    BMI 23.94 kg/m2       Constitutional: Awake, alert, and in no acute distress. Neurological: 1+ symmetrical DTRs in the upper extremities. 1+ symmetrical DTRs in the lower extremities. Sensation to light touch is intact. Negative Arlette's sign bilaterally. Skin: warm, dry, and intact. Musculoskeletal: Decreased range of motion with side to side flexion. Minimal pain with palpation of the trapezius. No tenderness to palpation in the lower lumbar region. No pain with extension, axial loading, or forward flexion. No pain with internal or external rotation of her hips. Negative straight leg raise bilaterally. Biceps  Triceps Deltoids Wrist Ext Wrist Flex Hand Intrin   Right 4/5 4/5 4/5 4/5 4/5 4/5   Left 4/5 4/5 4/5 4/5 4/5 4/5      Hip Flex  Quads Hamstrings Ankle DF EHL Ankle PF   Right +4/5 +4/5 +4/5 +4/5 +4/5 +4/5   Left +4/5 +4/5 +4/5 +4/5 +4/5 +4/5     IMAGING:    Cervical spine 2V x-rays from 11/27/2017 were personally reviewed and demonstrated:  Degenerative discs at C4, C5, and C6. Degenerative facets.     Lumbar spine 4V x-rays from 11/27/2017 were personally reviewed and demonstrated:  Mild degenerative joint findings in both hips. Multilevel degenerative facets. Mild degenerative disc disease. Lithesis at L5-S1      Written by Brigid Cowden, as dictated by Yudy Braswell MD.  I, Dr. Yudy Braswell confirm that all documentation is accurate.

## 2018-01-10 NOTE — PATIENT INSTRUCTIONS
Neck Arthritis: Exercises  Your Care Instructions  Here are some examples of typical rehabilitation exercises for your condition. Start each exercise slowly. Ease off the exercise if you start to have pain. Your doctor or physical therapist will tell you when you can start these exercises and which ones will work best for you. How to do the exercises  Neck stretches to the side    1. This stretch works best if you keep your shoulder down as you lean away from it. To help you remember to do this, start by relaxing your shoulders and lightly holding on to your thighs or your chair. 2. Tilt your head toward your shoulder and hold for 15 to 30 seconds. Let the weight of your head stretch your muscles. 3. Repeat 2 to 4 times toward each shoulder. Chin tuck    1. Lie on the floor with a rolled-up towel under your neck. Your head should be touching the floor. 2. Slowly bring your chin toward your chest.  3. Hold for a count of 6, and then relax for up to 10 seconds. 4. Repeat 8 to 12 times. Active cervical rotation    1. Sit in a firm chair, or stand up straight. 2. Keeping your chin level, turn your head to the right, and hold for 15 to 30 seconds. 3. Turn your head to the left and hold for 15 to 30 seconds. 4. Repeat 2 to 4 times to each side. Shoulder blade squeeze    1. While standing, squeeze your shoulder blades together. 2. Do not raise your shoulders up as you are squeezing. 3. Hold for 6 seconds. 4. Repeat 8 to 12 times. Shoulder rolls    1. Sit comfortably with your feet shoulder-width apart. You can also do this exercise standing up. 2. Roll your shoulders up, then back, and then down in a smooth, circular motion. 3. Repeat 2 to 4 times. Follow-up care is a key part of your treatment and safety. Be sure to make and go to all appointments, and call your doctor if you are having problems. It's also a good idea to know your test results and keep a list of the medicines you take.   Where can you learn more? Go to http://bella-camila.info/. Enter U568 in the search box to learn more about \"Neck Arthritis: Exercises. \"  Current as of: March 21, 2017  Content Version: 11.4  © 2499-1775 Healthwise, Incorporated. Care instructions adapted under license by [employer] (which disclaims liability or warranty for this information). If you have questions about a medical condition or this instruction, always ask your healthcare professional. [place] [address] any warranty or liability for your use of this information.

## 2018-01-10 NOTE — MR AVS SNAPSHOT
Visit Information Date & Time Provider Department Dept. Phone Encounter #  
 1/10/2018  8:30 AM Kyleigh Mak, [facility] [phone] Follow-up Instructions Return in about 2 months (around 3/10/2018) for follow up. Upcoming Health Maintenance Date Due Hepatitis C Screening 1960 DTaP/Tdap/Td series (1 - Tdap) 7/23/1981 PAP AKA CERVICAL CYTOLOGY 7/23/1981 BREAST CANCER SCRN MAMMOGRAM 7/23/2010 FOBT Q 1 YEAR AGE 50-75 7/23/2010 Influenza Age 5 to Adult 8/1/2017 Allergies as of 1/10/2018  Review Complete On: 1/10/2018 By: Kyleigh Mak MD  
 No Known Allergies Current Immunizations  Reviewed on 9/15/2016 No immunizations on file. Not reviewed this visit You Were Diagnosed With   
  
 Codes Comments DDD (degenerative disc disease), cervical    -  Primary ICD-10-CM: M50.30 ICD-9-CM: 722.4 Myofascial muscle pain     ICD-10-CM: M79.1 ICD-9-CM: 729.1 Muscle spasm     ICD-10-CM: G78.972 ICD-9-CM: 728.85 Vitals BP Pulse Temp Resp Height(growth percentile) Weight(growth percentile) 122/65 76 98.3 °F (36.8 °C) 18 5' 2.5\" (1.588 m) 133 lb (60.3 kg) SpO2 BMI OB Status Smoking Status 99% 23.94 kg/m2 Unknown Never Smoker BMI and BSA Data Body Mass Index Body Surface Area  
 23.94 kg/m 2 1.63 m 2 Preferred Pharmacy Pharmacy Name Phone 80 [facility] De[facility], [address] [place] [address] [phone] Your Updated Medication List  
  
   
This list is accurate as of: 1/10/18  9:22 AM.  Always use your most recent med list.  
  
  
  
  
 gabapentin 300 mg capsule Commonly known as:  NEURONTIN Take 1 in the evening after dinner  Indications: NEUROPATHIC PAIN  
  
 * ibuprofen 200 mg tablet Commonly known as:  MOTRIN Take  by mouth. * ibuprofen 600 mg tablet Commonly known as:  MOTRIN  
 Take 1 po bid with food as needed for pain  Indications: Pain * Notice: This list has 2 medication(s) that are the same as other medications prescribed for you. Read the directions carefully, and ask your doctor or other care provider to review them with you. Follow-up Instructions Return in about 2 months (around 3/10/2018) for follow up. Patient Instructions Neck Arthritis: Exercises Your Care Instructions Here are some examples of typical rehabilitation exercises for your condition. Start each exercise slowly. Ease off the exercise if you start to have pain. Your doctor or physical therapist will tell you when you can start these exercises and which ones will work best for you. How to do the exercises Neck stretches to the side 1. This stretch works best if you keep your shoulder down as you lean away from it. To help you remember to do this, start by relaxing your shoulders and lightly holding on to your thighs or your chair. 2. Tilt your head toward your shoulder and hold for 15 to 30 seconds. Let the weight of your head stretch your muscles. 3. Repeat 2 to 4 times toward each shoulder. Chin tuck 1. Lie on the floor with a rolled-up towel under your neck. Your head should be touching the floor. 2. Slowly bring your chin toward your chest. 
3. Hold for a count of 6, and then relax for up to 10 seconds. 4. Repeat 8 to 12 times. Active cervical rotation 1. Sit in a firm chair, or stand up straight. 2. Keeping your chin level, turn your head to the right, and hold for 15 to 30 seconds. 3. Turn your head to the left and hold for 15 to 30 seconds. 4. Repeat 2 to 4 times to each side. Shoulder blade squeeze 1. While standing, squeeze your shoulder blades together. 2. Do not raise your shoulders up as you are squeezing. 3. Hold for 6 seconds. 4. Repeat 8 to 12 times. Shoulder rolls 1. Sit comfortably with your feet shoulder-width apart. You can also do this exercise standing up. 2. Roll your shoulders up, then back, and then down in a smooth, circular motion. 3. Repeat 2 to 4 times. Follow-up care is a key part of your treatment and safety. Be sure to make and go to all appointments, and call your doctor if you are having problems. It's also a good idea to know your test results and keep a list of the medicines you take. Where can you learn more? Go to http://bella-camila.info/. Enter O748 in the search box to learn more about \"Neck Arthritis: Exercises. \" Current as of: March 21, 2017 Content Version: 11.4 © 9837-6450 [employer]. Care instructions adapted under license by [employer] (which disclaims liability or warranty for this information). If you have questions about a medical condition or this instruction, always ask your healthcare professional. [place] [address] any warranty or liability for your use of this information. Introducing [facility] & HEALTH SERVICES! Dear Angie Houser: Thank you for requesting a [employer] account. Our records indicate that you already have an active [employer] account. You can access your account anytime at [email or web address]. [employer]/Work Inspire Did you know that you can access your hospital and ER discharge instructions at any time in [employer]? You can also review all of your test results from your hospital stay or ER visit. Additional Information If you have questions, please visit the Frequently Asked Questions section of the [employer] website at [email or web address] Remember, [employer] is NOT to be used for urgent needs. For medical emergencies, dial 911. Now available from your iPhone and Android! Please provide this summary of care documentation to your next provider. Your primary care clinician is listed as Siena Fuchs.  If you have any questions after today's visit, please call [phone].

## 2018-02-08 ENCOUNTER — OFFICE VISIT (OUTPATIENT)
Dept: INTERNAL MEDICINE CLINIC | Age: 58
End: 2018-02-08

## 2018-02-08 ENCOUNTER — HOSPITAL ENCOUNTER (OUTPATIENT)
Dept: LAB | Age: 58
Discharge: HOME | End: 2018-02-08
Payer: COMMERCIAL

## 2018-02-08 VITALS
HEART RATE: 67 BPM | BODY MASS INDEX: 23.25 KG/M2 | SYSTOLIC BLOOD PRESSURE: 124 MMHG | HEIGHT: 63 IN | RESPIRATION RATE: 16 BRPM | TEMPERATURE: 97.4 F | WEIGHT: 131.2 LBS | DIASTOLIC BLOOD PRESSURE: 74 MMHG | OXYGEN SATURATION: 99 %

## 2018-02-08 DIAGNOSIS — D50.9 IRON DEFICIENCY ANEMIA, UNSPECIFIED IRON DEFICIENCY ANEMIA TYPE: ICD-10-CM

## 2018-02-08 DIAGNOSIS — Z00.00 WELLNESS EXAMINATION: ICD-10-CM

## 2018-02-08 DIAGNOSIS — H93.12 TINNITUS, LEFT EAR: ICD-10-CM

## 2018-02-08 DIAGNOSIS — F41.9 ANXIETY: ICD-10-CM

## 2018-02-08 DIAGNOSIS — N85.00 ENDOMETRIAL HYPERPLASIA: ICD-10-CM

## 2018-02-08 DIAGNOSIS — M54.2 CERVICAL PAIN: Primary | ICD-10-CM

## 2018-02-08 DIAGNOSIS — R00.2 PALPITATIONS: ICD-10-CM

## 2018-02-08 PROBLEM — R42 VERTIGO: Status: ACTIVE | Noted: 2018-02-08

## 2018-02-08 LAB
ALBUMIN SERPL-MCNC: 4.2 G/DL (ref 3.4–5)
ALBUMIN/GLOB SERPL: 1.2 {RATIO} (ref 0.8–1.7)
ALP SERPL-CCNC: 88 U/L (ref 45–117)
ALT SERPL-CCNC: 12 U/L (ref 13–56)
ANION GAP SERPL CALC-SCNC: 10 MMOL/L (ref 3–18)
APPEARANCE UR: CLEAR
AST SERPL-CCNC: 12 U/L (ref 15–37)
BASOPHILS # BLD: 0 K/UL (ref 0–0.06)
BASOPHILS NFR BLD: 1 % (ref 0–2)
BILIRUB SERPL-MCNC: 0.4 MG/DL (ref 0.2–1)
BILIRUB UR QL: NEGATIVE
BUN SERPL-MCNC: 18 MG/DL (ref 7–18)
BUN/CREAT SERPL: 25 (ref 12–20)
CALCIUM SERPL-MCNC: 9.3 MG/DL (ref 8.5–10.1)
CHLORIDE SERPL-SCNC: 104 MMOL/L (ref 100–108)
CHOLEST SERPL-MCNC: 227 MG/DL
CO2 SERPL-SCNC: 28 MMOL/L (ref 21–32)
COLOR UR: YELLOW
CREAT SERPL-MCNC: 0.72 MG/DL (ref 0.6–1.3)
DIFFERENTIAL METHOD BLD: NORMAL
EOSINOPHIL # BLD: 0.2 K/UL (ref 0–0.4)
EOSINOPHIL NFR BLD: 4 % (ref 0–5)
ERYTHROCYTE [DISTWIDTH] IN BLOOD BY AUTOMATED COUNT: 13.1 % (ref 11.6–14.5)
FOLATE SERPL-MCNC: 14.8 NG/ML (ref 3.1–17.5)
GLOBULIN SER CALC-MCNC: 3.5 G/DL (ref 2–4)
GLUCOSE SERPL-MCNC: 82 MG/DL (ref 74–99)
GLUCOSE UR STRIP.AUTO-MCNC: NEGATIVE MG/DL
HBA1C MFR BLD: 5.5 % (ref 4.2–5.6)
HCT VFR BLD AUTO: 41.5 % (ref 35–45)
HDLC SERPL-MCNC: 63 MG/DL (ref 40–60)
HDLC SERPL: 3.6 {RATIO} (ref 0–5)
HGB BLD-MCNC: 13.6 G/DL (ref 12–16)
HGB UR QL STRIP: NEGATIVE
IRON SATN MFR SERPL: 29 %
IRON SERPL-MCNC: 83 UG/DL (ref 50–175)
KETONES UR QL STRIP.AUTO: NEGATIVE MG/DL
LDLC SERPL CALC-MCNC: 142.2 MG/DL (ref 0–100)
LEUKOCYTE ESTERASE UR QL STRIP.AUTO: NEGATIVE
LIPID PROFILE,FLP: ABNORMAL
LYMPHOCYTES # BLD: 1.2 K/UL (ref 0.9–3.6)
LYMPHOCYTES NFR BLD: 21 % (ref 21–52)
MCH RBC QN AUTO: 29.8 PG (ref 24–34)
MCHC RBC AUTO-ENTMCNC: 32.8 G/DL (ref 31–37)
MCV RBC AUTO: 90.8 FL (ref 74–97)
MONOCYTES # BLD: 0.4 K/UL (ref 0.05–1.2)
MONOCYTES NFR BLD: 7 % (ref 3–10)
NEUTS SEG # BLD: 3.8 K/UL (ref 1.8–8)
NEUTS SEG NFR BLD: 67 % (ref 40–73)
NITRITE UR QL STRIP.AUTO: NEGATIVE
PH UR STRIP: 5.5 [PH] (ref 5–8)
PLATELET # BLD AUTO: 246 K/UL (ref 135–420)
PMV BLD AUTO: 11.8 FL (ref 9.2–11.8)
POTASSIUM SERPL-SCNC: 3.5 MMOL/L (ref 3.5–5.5)
PROT SERPL-MCNC: 7.7 G/DL (ref 6.4–8.2)
PROT UR STRIP-MCNC: NEGATIVE MG/DL
RBC # BLD AUTO: 4.57 M/UL (ref 4.2–5.3)
RETICS/RBC NFR AUTO: 0.9 % (ref 0.5–2.3)
SODIUM SERPL-SCNC: 142 MMOL/L (ref 136–145)
SP GR UR REFRACTOMETRY: 1.02 (ref 1–1.03)
T4 FREE SERPL-MCNC: 1 NG/DL (ref 0.7–1.5)
TIBC SERPL-MCNC: 290 UG/DL (ref 250–450)
TRIGL SERPL-MCNC: 109 MG/DL (ref ?–150)
TSH SERPL DL<=0.05 MIU/L-ACNC: 3.41 UIU/ML (ref 0.36–3.74)
UROBILINOGEN UR QL STRIP.AUTO: 0.2 EU/DL (ref 0.2–1)
VIT B12 SERPL-MCNC: 609 PG/ML (ref 211–911)
VLDLC SERPL CALC-MCNC: 21.8 MG/DL
WBC # BLD AUTO: 5.6 K/UL (ref 4.6–13.2)

## 2018-02-08 PROCEDURE — 85045 AUTOMATED RETICULOCYTE COUNT: CPT | Performed by: INTERNAL MEDICINE

## 2018-02-08 PROCEDURE — 80061 LIPID PANEL: CPT | Performed by: INTERNAL MEDICINE

## 2018-02-08 PROCEDURE — 83540 ASSAY OF IRON: CPT | Performed by: INTERNAL MEDICINE

## 2018-02-08 PROCEDURE — 81003 URINALYSIS AUTO W/O SCOPE: CPT | Performed by: INTERNAL MEDICINE

## 2018-02-08 PROCEDURE — 80074 ACUTE HEPATITIS PANEL: CPT | Performed by: INTERNAL MEDICINE

## 2018-02-08 PROCEDURE — 87389 HIV-1 AG W/HIV-1&-2 AB AG IA: CPT | Performed by: INTERNAL MEDICINE

## 2018-02-08 PROCEDURE — 80053 COMPREHEN METABOLIC PANEL: CPT | Performed by: INTERNAL MEDICINE

## 2018-02-08 PROCEDURE — 85025 COMPLETE CBC W/AUTO DIFF WBC: CPT | Performed by: INTERNAL MEDICINE

## 2018-02-08 PROCEDURE — 82607 VITAMIN B-12: CPT | Performed by: INTERNAL MEDICINE

## 2018-02-08 PROCEDURE — 82306 VITAMIN D 25 HYDROXY: CPT | Performed by: INTERNAL MEDICINE

## 2018-02-08 PROCEDURE — 84439 ASSAY OF FREE THYROXINE: CPT | Performed by: INTERNAL MEDICINE

## 2018-02-08 PROCEDURE — 83036 HEMOGLOBIN GLYCOSYLATED A1C: CPT | Performed by: INTERNAL MEDICINE

## 2018-02-08 NOTE — MR AVS SNAPSHOT
[facility] 
 
 
 [address] [facility], [address] [place] [address] [place] [address] 
[phone] Patient: Arcadio Cain MRN: [id number] [facility]:2[birth year]66 Visit Information Date & Time Provider Department Dept. Phone Encounter #  
 2/8/2018 10:30 AM Xochilt Angulo MD [facility] [address] [place] [address]  Follow-up Instructions Return in about 4 weeks (around 3/8/2018). Follow-up and Disposition History Your Appointments 3/8/2018  9:30 AM  
Office Visit with Xochilt Angulo MD  
[facility] [address] [place] [address] [facility]: [facility] f/u  
 [address] [facility], [place] [address] [address]  
  
   
 [address], [address]  
  
    
 3/12/2018  8:00 AM  
Follow Up with Alyssa Whiteside MD  
[facility]) Appt Note: 2 mnth fu  
 [address] [facility] [address]  
[phone]  
  
   
 [address] [facility] [address] Upcoming Health Maintenance Date Due  
 PAP AKA CERVICAL CYTOLOGY 7/23/1981 BREAST CANCER SCRN MAMMOGRAM 7/23/2010 FOBT Q 1 YEAR AGE 50-75 7/23/2010 DTaP/Tdap/Td series (2 - Td) 2/8/2028 Allergies as of 2/8/2018  Review Complete On: 2/8/2018 By: Xochilt Angulo MD  
 No Known Allergies Current Immunizations  Reviewed on 9/15/2016 No immunizations on file. Not reviewed this visit You Were Diagnosed With   
  
 Codes Comments Cervical pain    -  Primary ICD-10-CM: M54.2 ICD-9-CM: 723.1 Iron deficiency anemia, unspecified iron deficiency anemia type     ICD-10-CM: D50.9 ICD-9-CM: 280.9 Wellness examination     ICD-10-CM: Z00.00 ICD-9-CM: V70.0 Anxiety     ICD-10-CM: F41.9 ICD-9-CM: 300.00 Palpitations     ICD-10-CM: R00.2 ICD-9-CM: 785.1 Patient is Mandaen     ICD-10-CM: Z78.9 ICD-9-CM: V49.89 Endometrial hyperplasia     ICD-10-CM: N85.00 ICD-9-CM: 621.30 Tinnitus, left ear     ICD-10-CM: H93.12 
ICD-9-CM: 388.30 Vitals BP Pulse Temp Resp Height(growth percentile) Weight(growth percentile) 124/74 (BP 1 Location: Right arm, BP Patient Position: Sitting) 67 97.4 °F (36.3 °C) (Oral) 16 5' 2.5\" (1.588 m) 131 lb 3.2 oz (59.5 kg) SpO2 BMI OB Status Smoking Status 99% 23.61 kg/m2 Unknown Never Smoker Vitals History BMI and BSA Data Body Mass Index Body Surface Area  
 23.61 kg/m 2 1.62 m 2 Preferred Pharmacy Pharmacy Name Phone 80 [facility] [facility], [address] [place] [address] [phone] Your Updated Medication List  
  
Notice  As of 2/8/2018 12:09 PM  
 You have not been prescribed any medications. We Performed the Following AMB POC EKG ROUTINE W/ 12 LEADS, INTER & REP [81667 CPT(R)] REFERRAL TO GYNECOLOGY [REF30 Custom] Follow-up Instructions Return in about 4 weeks (around 3/8/2018). To-Do List   
 02/08/2018 Imaging:  ITALO MAMMO BI SCREENING INCL CAD Referral Information Referral ID Referred By Referred To  
  
 [id number] [place] [address],[facility] Phone: [phone] Fax: [phone] Visits Status Start Date End Date 1 New Request 2/8/18 2/8/19 If your referral has a status of pending review or denied, additional information will be sent to support the outcome of this decision. Introducing [facility] SERVICES! Dear Jeancarlos Torres: Thank you for requesting a [employer] account. Our records indicate that you already have an active [employer] account. You can access your account anytime at [email or web address] Did you know that you can access your hospital and ER discharge instructions at any time in Platfora? You can also review all of your test results from your hospital stay or ER visit. Additional Information If you have questions, please visit the Frequently Asked Questions section of the [employer] website at [email or web address] Remember, [employer] is NOT to be used for urgent needs. For medical emergencies, dial 911. Now available from your iPhone and Android! Please provide this summary of care documentation to your next provider. Your primary care clinician is listed as Susanne Ramos. If you have any questions after today's visit, please call [phone].

## 2018-02-08 NOTE — PROGRESS NOTES
1. Have you been to the ER, urgent care clinic or hospitalized since your last visit? NO.     2. Have you seen or consulted any other health care providers outside of the [address] [place] [address] since your last visit (Include any pap smears or colon screening)? YES Dr Allison Mckenzie       Do you have an Advanced Directive? NO    Would you like information on Advanced Directives?  NO

## 2018-02-08 NOTE — PROGRESS NOTES
HPI     Khadra Madden is a 62 y.o. female with relevant past medical history of cervical pain, due to DJD, myofascial pain, anemia likely secondary to fibroids, h/o endometrial hyperplasia, complex without atypia s/p TLH, BSO/cystoscopy done in 9/15/2016, vertigo, tinnitus, and anxiety  who presents today to establish medical care and evaluation of palpitations. She tells me she had an episode of palpitations about 3 years ago and she under go cardiac workup which was unremarkable she recalls having an echocardiogram and EKG back then when she was in [place], that time it was presumably secondary to stress from work. She is concerned because the palpitations returned since last Saturday daily, they last about 5 minutes, they are associated to cough and a heavy sensation in the chest, but she denies any chest pain, diaphoresis, nausea, vomiting, malaise, weakness, headaches, motor or sensory changes, acute beats, or flutter sensation in her chest.  She does not report any significant stress in her life at this time, but she does admit to having increased anxiety and worrying a lot for many reasons. He denies any family history of cardiac disease, but cancer runs in her family. He denies any tobacco use any alcohol use or any illicit drug use. She is a [employer]. She lives with her  and has 3 grown children. Works as an  part-time. She reports history of vertigo, left ear tinnitus, well controlled at this time. She reports mild fatigue but denies any fevers, chills, malaise, weakness, weight loss, changes in appetite, abdominal pain, or any other pain except for occasional cervical pain. In terms of her cervical pain she has been seeing orthopedist and they have been trying physical therapy and NSAIDs as needed for pain control. She reports improvement of her pain with those interventions.     Review of Systems   Constitutional: Negative for chills, diaphoresis, fever, malaise/fatigue and weight loss. HENT: Positive for tinnitus. Negative for congestion, ear discharge, ear pain, hearing loss, nosebleeds, sinus pain and sore throat. Eyes: Negative for blurred vision, double vision, photophobia, pain, discharge and redness. Respiratory: Positive for cough. Negative for hemoptysis, sputum production, shortness of breath, wheezing and stridor. Cardiovascular: Positive for palpitations. Negative for chest pain, orthopnea, claudication, leg swelling and PND. Gastrointestinal: Negative for abdominal pain, blood in stool, constipation, diarrhea, heartburn, melena, nausea and vomiting. Genitourinary: Negative for dysuria, flank pain, frequency, hematuria and urgency. Musculoskeletal: Positive for myalgias and neck pain. Negative for back pain, falls and joint pain. Skin: Negative for itching and rash. Neurological: Negative for dizziness (history of vertigo), tingling, tremors, sensory change, speech change, focal weakness, seizures, loss of consciousness, weakness and headaches. Endo/Heme/Allergies: Does not bruise/bleed easily. Psychiatric/Behavioral: Negative for depression, hallucinations, memory loss, substance abuse and suicidal ideas. The patient is nervous/anxious. The patient does not have insomnia. Past Medical History  Past Medical History:   Diagnosis Date    Cancer ([facility] [address]     DJD (degenerative joint disease), cervical     Fibroids 09/15/2016    s/p TLH, BSO/cystoscopy 9/15/2016    Iron deficiency anemia     per labs 2016     Past Surgical History:   Procedure Laterality Date    HX GYN      3 c-sections    HX GYN      Full hesterectomy        Family History  Family History   Problem Relation Age of Onset    Cancer Mother     Cancer Sister     Cancer Maternal Grandmother     Cancer Maternal Grandfather        Social History  She  reports that she has never smoked.  She has never used smokeless tobacco.   History   Alcohol Use No Immunization History    There is no immunization history on file for this patient. Allergies  No Known Allergies    Medications  No current outpatient prescriptions on file. No current facility-administered medications for this visit. Visit Vitals    /74 (BP 1 Location: Right arm, BP Patient Position: Sitting)    Pulse 67    Temp 97.4 °F (36.3 °C) (Oral)    Resp 16    Ht 5' 2.5\" (1.588 m)    Wt 131 lb 3.2 oz (59.5 kg)    SpO2 99%    BMI 23.61 kg/m2     Body mass index is 23.61 kg/(m^2). Physical Exam   Constitutional: She is oriented to person, place, and time and well-developed, well-nourished, and in no distress. No distress. HENT:   Head: Normocephalic and atraumatic. Right Ear: External ear normal.   Left Ear: External ear normal.   Nose: Nose normal.   Mouth/Throat: Oropharynx is clear and moist. No oropharyngeal exudate. Eyes: Conjunctivae are normal. Pupils are equal, round, and reactive to light. Neck: Normal range of motion. Neck supple. No JVD present. No tracheal deviation present. No thyromegaly present. Cardiovascular: Normal rate, regular rhythm, normal heart sounds and intact distal pulses. Pulmonary/Chest: Effort normal and breath sounds normal. No stridor. No respiratory distress. She has no wheezes. She has no rales. She exhibits no tenderness. Abdominal: Soft. Bowel sounds are normal. She exhibits no distension. There is no tenderness. Musculoskeletal: Normal range of motion. She exhibits no edema, tenderness or deformity. Lymphadenopathy:     She has no cervical adenopathy. Neurological: She is alert and oriented to person, place, and time. She has normal reflexes. No cranial nerve deficit. Gait normal. Coordination normal. GCS score is 15. Skin: Skin is warm. No rash noted. She is not diaphoretic. No erythema. No pallor. Psychiatric: Mood, memory, affect and judgment normal.   Nursing note and vitals reviewed.         REVIEW OF DATA    Labs  No visits with results within 1 Month(s) from this visit. Latest known visit with results is:    Admission on 09/15/2016, Discharged on 09/16/2016   Component Date Value Ref Range Status    WBC 09/16/2016 10.9  4.6 - 13.2 K/uL Final    RBC 09/16/2016 3.30* 4.20 - 5.30 M/uL Final    HGB 09/16/2016 9.5* 12.0 - 16.0 g/dL Final    HCT 09/16/2016 28.9* 35.0 - 45.0 % Final    MCV 09/16/2016 87.6  74.0 - 97.0 FL Final    MCH 09/16/2016 28.8  24.0 - 34.0 PG Final    MCHC 09/16/2016 32.9  31.0 - 37.0 g/dL Final    RDW 09/16/2016 12.9  11.6 - 14.5 % Final    PLATELET 25/96/4475 611  135 - 420 K/uL Final    MPV 09/16/2016 11.1  9.2 - 11.8 FL Final    Sodium 09/16/2016 139  136 - 145 mmol/L Final    Potassium 09/16/2016 4.2  3.5 - 5.5 mmol/L Final    Chloride 09/16/2016 105  100 - 108 mmol/L Final    CO2 09/16/2016 27  21 - 32 mmol/L Final    Anion gap 09/16/2016 7  3.0 - 18 mmol/L Final    Glucose 09/16/2016 102* 74 - 99 mg/dL Final    BUN 09/16/2016 14  7.0 - 18 MG/DL Final    Creatinine 09/16/2016 0.67  0.6 - 1.3 MG/DL Final    BUN/Creatinine ratio 09/16/2016 21* 12 - 20   Final    GFR est AA 09/16/2016 >60  >60 ml/min/1.73m2 Final    GFR est non-AA 09/16/2016 >60  >60 ml/min/1.73m2 Final    Calcium 09/16/2016 7.9* 8.5 - 10.1 MG/DL Final    Bilirubin, total 09/16/2016 0.4  0.2 - 1.0 MG/DL Final    ALT (SGPT) 09/16/2016 34  13 - 56 U/L Final    AST (SGOT) 09/16/2016 22  15 - 37 U/L Final    Alk. phosphatase 09/16/2016 61  45 - 117 U/L Final    Protein, total 09/16/2016 5.5* 6.4 - 8.2 g/dL Final    Albumin 09/16/2016 2.9* 3.4 - 5.0 g/dL Final    Globulin 09/16/2016 2.6  2.0 - 4.0 g/dL Final    A-G Ratio 09/16/2016 1.1  0.8 - 1.7   Final       Imaging  Lumbar and cervical X-ramesh form 11/27/17 reviewed. Cervical with mild DJD, normal lumbar appearance.           DIAGNOSIS AND PLAN  Patient Active Problem List   Diagnosis Code    S/P hysterectomy Z90.710    Iron deficiency anemia D50.9  Fibroids D25.9    DJD (degenerative joint disease), cervical M50.30    Anxiety F41.9    Patient is Rastafarian Z78.9    Palpitations R00.2    Endometrial hyperplasia N85.00     1. Cervical pain  - Status post physical therapy on as needed pain medication with ibuprofen as needed  - Pain is controlled at this time    2. Iron deficiency anemia, unspecified iron deficiency anemia type  CBC from September 2016 with hemoglobin of 9.5, hematocrit 28.9, MCV 87.6, and reports today occasional palpitations and fatigue. Presumably ferropenic given findings during hysterectomy of   -Repeat CBC ordered  -B12 and folate ordered  -Iron profile ordered  -Retic count ordered    3. Wellness examination  -  Physcial exam done except pelvic exam- deferred to OB/GYN referred for follow up and pap smear  - Screening labs as ordered  HM  - Declined all vaccinations  - Reports history of colonoscopy done in [place] a couple of years ago per patient normal findings will try to find records  - Unsure of last mammogram date, but presumably done last year or in 2016 and per patient normal findings, look for old records  -As above referred to GYN for follow-up and Pap smear  -Hepatitis C screening today    4. History of complex endometrial hyperplasia without atypia  - S/p total hysterectomy and salpingo-oophorectomy  Sept. 15 2016    5. Palpitations  - Rule out anemia  - TFTs ordered  - EKG ordered  - electrolytes ordered  - Anxiety is a possible trigger    6. History of anxiety  - We will monitor closely for now off medication    7. Tinnitus left ear  Reported history of trauma in left ear as a child with insect entering her ear, she describes a buzzing sensation in her left ear on and off, triggered by pain, occasionally associated with tinnitus and vertigo. 8. Vertigo  Unclear etiology, seems to have features of BPV. No recent episodes. Denies hearing loss, does not meet triad for Meniere;s disease.   Follow-up Disposition: Not on File     Christiana Abernathy MD

## 2018-02-09 PROBLEM — E78.5 HYPERLIPIDEMIA: Status: ACTIVE | Noted: 2018-02-09

## 2018-02-09 LAB
25(OH)D3 SERPL-MCNC: 22 NG/ML (ref 30–100)
HAV IGM SER QL: NEGATIVE
HBV CORE IGM SER QL: NEGATIVE
HBV SURFACE AG SER QL: <0.1 INDEX
HBV SURFACE AG SER QL: NEGATIVE
HCV AB SER IA-ACNC: 0.05 INDEX
HCV AB SERPL QL IA: NEGATIVE
HCV COMMENT,HCGAC: NORMAL
HIV 1+2 AB+HIV1 P24 AG SERPL QL IA: NONREACTIVE
HIV12 RESULT COMMENT, HHIVC: NORMAL
SP1: NORMAL
SP2: NORMAL
SP3: NORMAL

## 2018-03-06 ENCOUNTER — TELEPHONE (OUTPATIENT)
Dept: INTERNAL MEDICINE CLINIC | Age: 58
End: 2018-03-06

## 2018-03-06 NOTE — TELEPHONE ENCOUNTER
I called the patient back, left a voice message recommending to come in for a visit if cough is worsening for evaluation.   Thanks,    MR

## 2018-03-06 NOTE — TELEPHONE ENCOUNTER
Patient stating she comes down with a cough every 2 months. Stating when she saw you a month ago she forgot to mtntion it to you. She is asking to speak to you because she states it is getting worse.

## 2018-03-08 ENCOUNTER — OFFICE VISIT (OUTPATIENT)
Dept: INTERNAL MEDICINE CLINIC | Age: 58
End: 2018-03-08

## 2018-03-08 ENCOUNTER — HOSPITAL ENCOUNTER (OUTPATIENT)
Dept: GENERAL RADIOLOGY | Age: 58
Discharge: HOME | End: 2018-03-08
Payer: COMMERCIAL

## 2018-03-08 VITALS
HEIGHT: 63 IN | BODY MASS INDEX: 22.75 KG/M2 | SYSTOLIC BLOOD PRESSURE: 118 MMHG | RESPIRATION RATE: 19 BRPM | WEIGHT: 128.4 LBS | OXYGEN SATURATION: 98 % | DIASTOLIC BLOOD PRESSURE: 68 MMHG | TEMPERATURE: 98 F | HEART RATE: 68 BPM

## 2018-03-08 DIAGNOSIS — J40 BRONCHITIS: Primary | ICD-10-CM

## 2018-03-08 DIAGNOSIS — J40 BRONCHITIS: ICD-10-CM

## 2018-03-08 PROCEDURE — 71046 X-RAY EXAM CHEST 2 VIEWS: CPT

## 2018-03-08 RX ORDER — LORATADINE 10 MG/1
10 TABLET ORAL
Qty: 30 TAB | Refills: 3 | Status: SHIPPED | OUTPATIENT
Start: 2018-03-08

## 2018-03-08 RX ORDER — PREDNISONE 20 MG/1
20 TABLET ORAL
Qty: 5 TAB | Refills: 0 | Status: SHIPPED | OUTPATIENT
Start: 2018-03-08 | End: 2019-05-06 | Stop reason: ALTCHOICE

## 2018-03-08 RX ORDER — BENZONATATE 200 MG/1
200 CAPSULE ORAL
Qty: 30 CAP | Refills: 0 | Status: SHIPPED | OUTPATIENT
Start: 2018-03-08 | End: 2018-03-15

## 2018-03-08 NOTE — MR AVS SNAPSHOT
[facility] 
 
 
 [address], [address] [place] [address] [facility] 
[phone] Patient: Khadra Madden MRN: [id number] Visit Information Date & Time Provider Department Dept. Phone Encounter #  
 3/8/2018  9:30 AM Nae Atkinson MD [facility] [id number] Follow-up Instructions Return in about 6 months (around 9/8/2018). Your Appointments 3/12/2018  8:00 AM  
Follow Up with Robynn Cowden, MD  
[facility]) Appt Note: 2 m[address] fu  
 [address] [facility] [address]  
[phone]  
  
   
 [address]  
  
    
 9/4/2018  9:00 AM  
Office Visit with Nae Atkinson MD  
[facility]) [address], [facility] [address] [address]  
  
   
 [address], [address] Upcoming Health Maintenance Date Due  
 PAP AKA CERVICAL CYTOLOGY 7/23/1981 BREAST CANCER SCRN MAMMOGRAM 7/23/2010 FOBT Q 1 YEAR AGE 50-75 7/23/2010 DTaP/Tdap/Td series (2 - Td) 2/8/2028 Allergies as of 3/8/2018  Review Complete On: 3/8/2018 By: Nae Atkinson MD  
 No Known Allergies Current Immunizations  Reviewed on 9/15/2016 No immunizations on file. Not reviewed this visit You Were Diagnosed With   
  
 Codes Comments Bronchitis    -  Primary ICD-10-CM: X92 ICD-9-CM: 309 Vitals BP Pulse Temp Resp Height(growth percentile) Weight(growth percentile)  
 118/68 (BP 1 Location: Left arm, BP Patient Position: Sitting) 68 98 °F (36.7 °C) (Oral) 19 5' 2.5\" (1.588 m) 128 lb 6.4 oz (58.2 kg) SpO2 BMI OB Status Smoking Status 98% 23.11 kg/m2 Unknown Never Smoker Vitals History BMI and BSA Data Body Mass Index Body Surface Area 23.11 kg/m 2 1.6 m 2 Preferred Pharmacy Pharmacy Name Phone [facility], [address] [place] [address] [phone] Your Updated Medication List  
  
   
This list is accurate as of 3/8/18 10:06 AM.  Always use your most recent med list.  
  
  
  
  
 benzonatate 200 mg capsule Commonly known as:  TESSALON Take 1 Cap by mouth three (3) times daily as needed for Cough for up to 7 days. loratadine 10 mg tablet Commonly known as:  Eugene Garrison Take 1 Tab by mouth daily as needed for Allergies or Other (cough). predniSONE 20 mg tablet Commonly known as:  Petra Meridian Take 1 Tab by mouth daily (with breakfast). Prescriptions Sent to Pharmacy Refills  
 loratadine (CLARITIN) 10 mg tablet 3 Sig: Take 1 Tab by mouth daily as needed for Allergies or Other (cough). Class: Normal  
 [facility]acy:  [facility] Ph #: [phone] Route: Oral  
 predniSONE (DELTASONE) 20 mg tablet 0 Sig: Take 1 Tab by mouth daily (with breakfast). Class: Normal  
 [facility] [address] Ph #: [phone] Route: Oral  
 benzonatate (TESSALON) 200 mg capsule 0 Sig: Take 1 Cap by mouth three (3) times daily as needed for Cough for up to 7 days. Class: Normal  
 Pharmacy:  [facility] Ph #: [phone] Route: Oral  
  
Follow-up Instructions Return in about 6 months (around 9/8/2018). To-Do List   
 03/08/2018 Imaging:  XR CHEST PA LAT Introducing [facility]! Dear Adrian Gomez: Thank you for requesting a [employer] account. Our records indicate that you already have an active [employer] account. You can access your account anytime at [email or web address] Did you know that you can access your hospital and ER discharge instructions at any time in [employer]? You can also review all of your test results from your hospital stay or ER visit. Additional Information If you have questions, please visit the Frequently Asked Questions section of the [employer] website at [email or web address] Remember, [employer] is NOT to be used for urgent needs. For medical emergencies, dial 911. Now available from your iPhone and Android! Please provide this summary of care documentation to your next provider. Your primary care clinician is listed as Linda Mejía. If you have any questions after today's visit, please call [phone].

## 2018-03-08 NOTE — ACP (ADVANCE CARE PLANNING)
1. Have you been to the ER, urgent care clinic or hospitalized since your last visit? NO.     2. Have you seen or consulted any other health care providers outside of the [address] [place] [address] since your last visit (Include any pap smears or colon screening)? NO      Do you have an Advanced Directive? YES    Would you like information on Advanced Directives?  NO

## 2018-03-08 NOTE — PROGRESS NOTES
1. Have you been to the ER, urgent care clinic or hospitalized since your last visit? NO.     2. Have you seen or consulted any other health care providers outside of the [address] [place] [address] since your last visit (Include any pap smears or colon screening)? NO      Do you have an Advanced Directive? YES    Would you like information on Advanced Directives?  NO

## 2018-03-08 NOTE — PROGRESS NOTES
HPI     Flavio Warren is a 62 y.o. female with relevant past medical history of cervical pain, due to DJD, myofascial pain, anemia likely secondary to fibroids, h/o endometrial hyperplasia, complex without atypia s/p TLH, BSO/cystoscopy done in 9/15/2016, vertigo, tinnitus, and anxiety  who presents today for evaluation of chronic cough, worsening for the past 2 weeks. The patient says that for a couple of years now, she has noted around 2-3 episodes a year of productive cough, with white sputum, that lasts a couple of weeks, associated to sore throat, initially on her R side then extends to her left side. She denies any wheezing, SOB, runny nose, sinus pain, earache, fever, chills, night sweats, malaise, weight loss. She does find her self more fatigued during this episodes. She is concerned because many years ago she was seen in the hospital for cough and was found to have an abnormal opacity in her lung, she believes she was told it could be sencondary to chemical exposure (pneumonitis). She says in the past she has used bleach to clean and also used to help paint fences. She also admits her stress levels have been very high lately as she is dealing with a very difficult family situation, she becomes tearful as she is telling me, but she prefers not discuss any more details. Her present cough episode started about 2 weeks ago, it is not worsening, but it does not seem to be improving with OTC medications. She denies any know environmental allergies. ROS  As above included in HPI. Otherwise 11 point review of systems negative including constitutional, skin, HENT, eyes, respiratory, cardiovascular, gastrointestinal, genitourinary, musculoskeletal, endocrine, hematologic, allergy, and neurologic.     Past Medical History  Past Medical History:   Diagnosis Date    Cancer ([facility] [address]     DJD (degenerative joint disease), cervical     Fibroids 09/15/2016    s/p TLH, BSO/cystoscopy 9/15/2016    Iron deficiency anemia     per labs 2016     Past Surgical History:   Procedure Laterality Date    HX GYN      3 c-sections    HX GYN      Full hesterectomy        Family History  Family History   Problem Relation Age of Onset    Cancer Mother     Cancer Sister     Cancer Maternal Grandmother     Cancer Maternal Grandfather        Social History  She  reports that she has never smoked. She has never used smokeless tobacco.   History   Alcohol Use No       Immunization History    There is no immunization history on file for this patient. Allergies  No Known Allergies    Medications  Current Outpatient Prescriptions   Medication Sig    loratadine (CLARITIN) 10 mg tablet Take 1 Tab by mouth daily as needed for Allergies or Other (cough).  predniSONE (DELTASONE) 20 mg tablet Take 1 Tab by mouth daily (with breakfast).  benzonatate (TESSALON) 200 mg capsule Take 1 Cap by mouth three (3) times daily as needed for Cough for up to 7 days. No current facility-administered medications for this visit. Visit Vitals    /68 (BP 1 Location: Left arm, BP Patient Position: Sitting)    Pulse 68    Temp 98 °F (36.7 °C) (Oral)    Resp 19    Ht 5' 2.5\" (1.588 m)    Wt 128 lb 6.4 oz (58.2 kg)    SpO2 98%    BMI 23.11 kg/m2     Body mass index is 23.11 kg/(m^2). Physical Exam   Constitutional: She is oriented to person, place, and time and well-developed, well-nourished, and in no distress. HENT:   Head: Normocephalic and atraumatic. Right Ear: External ear normal.   Left Ear: External ear normal.   Nose: Nose normal.   Oropharynx congested, erythematous   Neck: Normal range of motion. Neck supple. No JVD present. No tracheal deviation present. No thyromegaly present. Cardiovascular: Normal rate and regular rhythm. Pulmonary/Chest: Effort normal and breath sounds normal. No stridor. No respiratory distress. She has no wheezes. She has no rales. She exhibits no tenderness.    Musculoskeletal: She exhibits no edema. Lymphadenopathy:     She has no cervical adenopathy. Neurological: She is alert and oriented to person, place, and time. Skin: Skin is warm. Psychiatric: Affect normal.   Depressed mood, no SI   Nursing note and vitals reviewed. REVIEW OF DATA    Labs  No visits with results within 1 Month(s) from this visit. Latest known visit with results is:    Hospital Outpatient Visit on 02/08/2018   Component Date Value Ref Range Status    WBC 02/08/2018 5.6  4.6 - 13.2 K/uL Final    RBC 02/08/2018 4.57  4.20 - 5.30 M/uL Final    HGB 02/08/2018 13.6  12.0 - 16.0 g/dL Final    HCT 02/08/2018 41.5  35.0 - 45.0 % Final    MCV 02/08/2018 90.8  74.0 - 97.0 FL Final    MCH 02/08/2018 29.8  24.0 - 34.0 PG Final    MCHC 02/08/2018 32.8  31.0 - 37.0 g/dL Final    RDW 02/08/2018 13.1  11.6 - 14.5 % Final    PLATELET 57/60/1459 867  135 - 420 K/uL Final    MPV 02/08/2018 11.8  9.2 - 11.8 FL Final    NEUTROPHILS 02/08/2018 67  40 - 73 % Final    LYMPHOCYTES 02/08/2018 21  21 - 52 % Final    MONOCYTES 02/08/2018 7  3 - 10 % Final    EOSINOPHILS 02/08/2018 4  0 - 5 % Final    BASOPHILS 02/08/2018 1  0 - 2 % Final    ABS. NEUTROPHILS 02/08/2018 3.8  1.8 - 8.0 K/UL Final    ABS. LYMPHOCYTES 02/08/2018 1.2  0.9 - 3.6 K/UL Final    ABS. MONOCYTES 02/08/2018 0.4  0.05 - 1.2 K/UL Final    ABS. EOSINOPHILS 02/08/2018 0.2  0.0 - 0.4 K/UL Final    ABS. BASOPHILS 02/08/2018 0.0  0.0 - 0.06 K/UL Final    DF 02/08/2018 AUTOMATED    Final    Hepatitis A, IgM 02/08/2018 NEGATIVE   NEG   Final    __ 02/08/2018        Final    Hepatitis B surface Ag 02/08/2018 <0.10  <1.00 Index Final    Hep B surface Ag Interp. 02/08/2018 NEGATIVE   NEG   Final    __ 02/08/2018        Final    Hepatitis B core, IgM 02/08/2018 NEGATIVE   NEG   Final    __ 02/08/2018        Final    Hepatitis C virus Ab 02/08/2018 0.05  <0.80 Index Final    Hep C  virus Ab Interp.  02/08/2018 NEGATIVE   NEG   Final    Hep C virus Ab comment 02/08/2018        Final    HIV 1/2 Interpretation 02/08/2018 NONREACTIVE  NR   Final    HIV 1/2 result comment 02/08/2018 SEE NOTE    Final    LIPID PROFILE 02/08/2018        Final    Cholesterol, total 02/08/2018 227* <200 MG/DL Final    Triglyceride 02/08/2018 109  <150 MG/DL Final    HDL Cholesterol 02/08/2018 63* 40 - 60 MG/DL Final    LDL, calculated 02/08/2018 142.2* 0 - 100 MG/DL Final    VLDL, calculated 02/08/2018 21.8  MG/DL Final    CHOL/HDL Ratio 02/08/2018 3.6  0 - 5.0   Final    Sodium 02/08/2018 142  136 - 145 mmol/L Final    Potassium 02/08/2018 3.5  3.5 - 5.5 mmol/L Final    Chloride 02/08/2018 104  100 - 108 mmol/L Final    CO2 02/08/2018 28  21 - 32 mmol/L Final    Anion gap 02/08/2018 10  3.0 - 18 mmol/L Final    Glucose 02/08/2018 82  74 - 99 mg/dL Final    BUN 02/08/2018 18  7.0 - 18 MG/DL Final    Creatinine 02/08/2018 0.72  0.6 - 1.3 MG/DL Final    BUN/Creatinine ratio 02/08/2018 25* 12 - 20   Final    GFR est AA 02/08/2018 >60  >60 ml/min/1.73m2 Final    GFR est non-AA 02/08/2018 >60  >60 ml/min/1.73m2 Final    Calcium 02/08/2018 9.3  8.5 - 10.1 MG/DL Final    Bilirubin, total 02/08/2018 0.4  0.2 - 1.0 MG/DL Final    ALT (SGPT) 02/08/2018 12* 13 - 56 U/L Final    AST (SGOT) 02/08/2018 12* 15 - 37 U/L Final    Alk.  phosphatase 02/08/2018 88  45 - 117 U/L Final    Protein, total 02/08/2018 7.7  6.4 - 8.2 g/dL Final    Albumin 02/08/2018 4.2  3.4 - 5.0 g/dL Final    Globulin 02/08/2018 3.5  2.0 - 4.0 g/dL Final    A-G Ratio 02/08/2018 1.2  0.8 - 1.7   Final    TSH 02/08/2018 3.41  0.36 - 3.74 uIU/mL Final    T4, Free 02/08/2018 1.0  0.7 - 1.5 NG/DL Final    Vitamin D 25-Hydroxy 02/08/2018 22.0* 30 - 100 ng/mL Final    Color 02/08/2018 YELLOW    Final    Appearance 02/08/2018 CLEAR    Final    Specific gravity 02/08/2018 1.023  1.005 - 1.030   Final    pH (UA) 02/08/2018 5.5  5.0 - 8.0   Final    Protein 02/08/2018 NEGATIVE   NEG mg/dL Final  Glucose 02/08/2018 NEGATIVE   NEG mg/dL Final    Ketone 02/08/2018 NEGATIVE   NEG mg/dL Final    Bilirubin 02/08/2018 NEGATIVE   NEG   Final    Blood 02/08/2018 NEGATIVE   NEG   Final    Urobilinogen 02/08/2018 0.2  0.2 - 1.0 EU/dL Final    Nitrites 02/08/2018 NEGATIVE   NEG   Final    Leukocyte Esterase 02/08/2018 NEGATIVE   NEG   Final    Hemoglobin A1c 02/08/2018 5.5  4.2 - 5.6 % Final    Iron 02/08/2018 83  50 - 175 ug/dL Final    TIBC 02/08/2018 290  250 - 450 ug/dL Final    Iron % saturation 02/08/2018 29  % Final    Vitamin B12 02/08/2018 609  211 - 911 pg/mL Final    Folate 02/08/2018 14.8  3.10 - 17.50 ng/mL Final    Reticulocyte count 02/08/2018 0.9  0.5 - 2.3 % Final         CT Results (most recent):  No results found for this or any previous visit. XR Results (most recent):  No results found for this or any previous visit. CT   All Micro Results     None              DIAGNOSIS AND PLAN  Patient Active Problem List   Diagnosis Code    S/P hysterectomy Z90.710    Iron deficiency anemia D50.9    Fibroids D25.9    DJD (degenerative joint disease), cervical M50.30    Anxiety F41.9    Patient is [employer] Z78.9    Palpitations R00.2    Endometrial hyperplasia N85.00    Vertigo R42    Tinnitus, left ear H93.12    Hyperlipidemia E78.5     1. 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 She says that a few years ago she had an abnormal CXR, but did not follow up after that. - XR CHEST PA LAT; Future  - loratadine (CLARITIN) 10 mg tablet; Take 1 Tab by mouth daily as needed for Allergies or Other (cough). Dispense: 30 Tab; Refill: 3  - predniSONE (DELTASONE) 20 mg tablet; Take 1 Tab by mouth daily (with breakfast). Dispense: 5 Tab; Refill: 0  - benzonatate (TESSALON) 200 mg capsule; Take 1 Cap by mouth three (3) times daily as needed for Cough for up to 7 days. Dispense: 30 Cap; Refill: 0         Follow-up Disposition:  Return in about 6 months (around 9/8/2018). Christiana Mensah MD

## 2019-04-12 ENCOUNTER — LAB ONLY (OUTPATIENT)
Dept: INTERNAL MEDICINE CLINIC | Age: 59
End: 2019-04-12

## 2019-04-12 ENCOUNTER — HOSPITAL ENCOUNTER (OUTPATIENT)
Dept: LAB | Age: 59
Discharge: HOME | End: 2019-04-12
Payer: COMMERCIAL

## 2019-04-12 DIAGNOSIS — E78.5 HYPERLIPIDEMIA, UNSPECIFIED HYPERLIPIDEMIA TYPE: Primary | ICD-10-CM

## 2019-04-12 DIAGNOSIS — E78.5 HYPERLIPIDEMIA, UNSPECIFIED HYPERLIPIDEMIA TYPE: ICD-10-CM

## 2019-04-12 DIAGNOSIS — Z00.00 ROUTINE GENERAL MEDICAL EXAMINATION AT A HEALTH CARE FACILITY: ICD-10-CM

## 2019-04-12 DIAGNOSIS — E55.9 VITAMIN D DEFICIENCY: ICD-10-CM

## 2019-04-12 LAB
ALBUMIN SERPL-MCNC: 4.3 G/DL (ref 3.4–5)
ALBUMIN/GLOB SERPL: 1.3 {RATIO} (ref 0.8–1.7)
ALP SERPL-CCNC: 86 U/L (ref 45–117)
ALT SERPL-CCNC: 29 U/L (ref 13–56)
ANION GAP SERPL CALC-SCNC: 8 MMOL/L (ref 3–18)
AST SERPL-CCNC: 20 U/L (ref 15–37)
BASOPHILS # BLD: 0 K/UL (ref 0–0.1)
BASOPHILS NFR BLD: 1 % (ref 0–2)
BILIRUB SERPL-MCNC: 0.5 MG/DL (ref 0.2–1)
BUN SERPL-MCNC: 25 MG/DL (ref 7–18)
BUN/CREAT SERPL: 34 (ref 12–20)
CALCIUM SERPL-MCNC: 8.9 MG/DL (ref 8.5–10.1)
CHLORIDE SERPL-SCNC: 105 MMOL/L (ref 100–108)
CHOLEST SERPL-MCNC: 245 MG/DL
CO2 SERPL-SCNC: 28 MMOL/L (ref 21–32)
CREAT SERPL-MCNC: 0.74 MG/DL (ref 0.6–1.3)
DIFFERENTIAL METHOD BLD: NORMAL
EOSINOPHIL # BLD: 0.2 K/UL (ref 0–0.4)
EOSINOPHIL NFR BLD: 4 % (ref 0–5)
ERYTHROCYTE [DISTWIDTH] IN BLOOD BY AUTOMATED COUNT: 13.4 % (ref 11.6–14.5)
GLOBULIN SER CALC-MCNC: 3.4 G/DL (ref 2–4)
GLUCOSE SERPL-MCNC: 86 MG/DL (ref 74–99)
HCT VFR BLD AUTO: 43.4 % (ref 35–45)
HDLC SERPL-MCNC: 77 MG/DL (ref 40–60)
HDLC SERPL: 3.2 {RATIO} (ref 0–5)
HGB BLD-MCNC: 14 G/DL (ref 12–16)
LDLC SERPL CALC-MCNC: 151.4 MG/DL (ref 0–100)
LIPID PROFILE,FLP: ABNORMAL
LYMPHOCYTES # BLD: 1.2 K/UL (ref 0.9–3.6)
LYMPHOCYTES NFR BLD: 23 % (ref 21–52)
MCH RBC QN AUTO: 29.2 PG (ref 24–34)
MCHC RBC AUTO-ENTMCNC: 32.3 G/DL (ref 31–37)
MCV RBC AUTO: 90.6 FL (ref 74–97)
MONOCYTES # BLD: 0.5 K/UL (ref 0.05–1.2)
MONOCYTES NFR BLD: 9 % (ref 3–10)
NEUTS SEG # BLD: 3.3 K/UL (ref 1.8–8)
NEUTS SEG NFR BLD: 63 % (ref 40–73)
PLATELET # BLD AUTO: 259 K/UL (ref 135–420)
PMV BLD AUTO: 11.4 FL (ref 9.2–11.8)
POTASSIUM SERPL-SCNC: 4.4 MMOL/L (ref 3.5–5.5)
PROT SERPL-MCNC: 7.7 G/DL (ref 6.4–8.2)
RBC # BLD AUTO: 4.79 M/UL (ref 4.2–5.3)
SODIUM SERPL-SCNC: 141 MMOL/L (ref 136–145)
TRIGL SERPL-MCNC: 83 MG/DL (ref ?–150)
VLDLC SERPL CALC-MCNC: 16.6 MG/DL
WBC # BLD AUTO: 5.3 K/UL (ref 4.6–13.2)

## 2019-04-12 PROCEDURE — 85025 COMPLETE CBC W/AUTO DIFF WBC: CPT

## 2019-04-12 PROCEDURE — 36415 COLL VENOUS BLD VENIPUNCTURE: CPT

## 2019-04-12 PROCEDURE — 80061 LIPID PANEL: CPT

## 2019-04-12 PROCEDURE — 80053 COMPREHEN METABOLIC PANEL: CPT

## 2019-04-12 PROCEDURE — 82306 VITAMIN D 25 HYDROXY: CPT

## 2019-04-13 LAB — 25(OH)D3 SERPL-MCNC: 21.7 NG/ML (ref 30–100)

## 2019-05-06 ENCOUNTER — OFFICE VISIT (OUTPATIENT)
Dept: INTERNAL MEDICINE CLINIC | Age: 59
End: 2019-05-06

## 2019-05-06 VITALS
RESPIRATION RATE: 17 BRPM | SYSTOLIC BLOOD PRESSURE: 116 MMHG | DIASTOLIC BLOOD PRESSURE: 76 MMHG | WEIGHT: 134.6 LBS | HEART RATE: 64 BPM | BODY MASS INDEX: 23.85 KG/M2 | HEIGHT: 63 IN | TEMPERATURE: 97.4 F | OXYGEN SATURATION: 98 %

## 2019-05-06 DIAGNOSIS — F41.9 ANXIETY: ICD-10-CM

## 2019-05-06 DIAGNOSIS — E55.9 VITAMIN D DEFICIENCY: ICD-10-CM

## 2019-05-06 DIAGNOSIS — Z12.11 COLON CANCER SCREENING: ICD-10-CM

## 2019-05-06 DIAGNOSIS — E78.5 HYPERLIPIDEMIA, UNSPECIFIED HYPERLIPIDEMIA TYPE: ICD-10-CM

## 2019-05-06 DIAGNOSIS — Z00.00 WELLNESS EXAMINATION: Primary | ICD-10-CM

## 2019-05-06 RX ORDER — ROSUVASTATIN CALCIUM 10 MG/1
20 TABLET, COATED ORAL
Qty: 30 TAB | Refills: 3 | Status: SHIPPED | OUTPATIENT
Start: 2019-05-06

## 2019-05-06 RX ORDER — ALPRAZOLAM 0.25 MG/1
0.25 TABLET ORAL
Qty: 3 TAB | Refills: 0 | Status: SHIPPED | OUTPATIENT
Start: 2019-05-06

## 2019-05-06 RX ORDER — ERGOCALCIFEROL 1.25 MG/1
50000 CAPSULE ORAL
Qty: 12 CAP | Refills: 0 | Status: SHIPPED | OUTPATIENT
Start: 2019-05-06

## 2019-05-06 RX ORDER — SERTRALINE HYDROCHLORIDE 25 MG/1
25 TABLET, FILM COATED ORAL DAILY
Qty: 30 TAB | Refills: 1 | Status: SHIPPED | OUTPATIENT
Start: 2019-05-06

## 2019-05-06 NOTE — PROGRESS NOTES
1. Have you been to the ER, urgent care clinic or hospitalized since your last visit? YES. On 8/2/18 for abscess of buttock    2. Have you seen or consulted any other health care providers outside of the [address] [place] [address] since your last visit (Include any pap smears or colon screening)? NO      Do you have an Advanced Directive? YES    Would you like information on Advanced Directives?  NO

## 2019-05-08 NOTE — PROGRESS NOTES
HPI     Supa Moseley is a 62 y.o. female with relevant past medical history of cervical pain, due to DJD, myofascial pain, anemia likely secondary to fibroids, h/o endometrial hyperplasia, complex without atypia s/p TLH, BSO/cystoscopy done in 9/15/2016, vertigo, tinnitus, and anxiety, here for annual wellness exam.   Since last visit the patient has been struggling with anxiety and depression, had issues with her , that caused her a lot of distress, suffering, sadness, and now she is trying to give the relationship another chance, but is struggling with increased anxiety that she reports is affecting her ability to work optimally. 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 Her most recent labs discussed today, with elevated lipids and low Vit D. She is interested in recommended therapy as indicated. She is due for colonoscopy and agrees with setting up GI visit. ROS  As above included in HPI. Otherwise 11 point review of systems negative including constitutional, skin, HENT, eyes, respiratory, cardiovascular, gastrointestinal, genitourinary, musculoskeletal, endocrine, hematologic, allergy, and neurologic. Past Medical History  Past Medical History:   Diagnosis Date    Cancer ([address]     DJD (degenerative joint disease), cervical     Fibroids 09/15/2016    s/p TLH, BSO/cystoscopy 9/15/2016    Iron deficiency anemia     per labs 2016     Past Surgical History:   Procedure Laterality Date    HX GYN      3 c-sections    HX GYN      Full hesterectomy        Family History  Family History   Problem Relation Age of Onset    Cancer Mother     Cancer Sister     Cancer Maternal Grandmother     Cancer Maternal Grandfather        Social History  She  reports that she has never smoked.  She has never used smokeless tobacco.   Social History     Substance and Sexual Activity   Alcohol Use No       Immunization History    There is no immunization history on file for this patient. Allergies  No Known Allergies    Medications  Current Outpatient Medications   Medication Sig    sertraline (ZOLOFT) 25 mg tablet Take 1 Tab by mouth daily.  ALPRAZolam (XANAX) 0.25 mg tablet Take 1 Tab by mouth daily as needed for Anxiety (for acute anxiety) for up to 3 doses.  ergocalciferol (ERGOCALCIFEROL) 50,000 unit capsule Take 1 Cap by mouth every seven (7) days.  rosuvastatin (CRESTOR) 10 mg tablet Take 2 Tabs by mouth nightly.  loratadine (CLARITIN) 10 mg tablet Take 1 Tab by mouth daily as needed for Allergies or Other (cough). No current facility-administered medications for this visit. Visit Vitals  /76 (BP 1 Location: Left arm, BP Patient Position: Sitting)   Pulse 64   Temp 97.4 °F (36.3 °C) (Oral)   Resp 17   Ht 5' 2.5\" (1.588 m)   Wt 134 lb 9.6 oz (61.1 kg)   SpO2 98%   BMI 24.23 kg/m²     Body mass index is 24.23 kg/m². Physical Exam   Constitutional: She is oriented to person, place, and time and well-developed, well-nourished, and in no distress. HENT:   Head: Normocephalic and atraumatic. Eyes: Conjunctivae are normal.   Neck: Neck supple. Cardiovascular: Normal rate. Pulmonary/Chest: Effort normal and breath sounds normal.   Abdominal: Soft. Musculoskeletal: Normal range of motion. She exhibits no edema. Neurological: She is alert and oriented to person, place, and time. Skin: Skin is warm. Psychiatric: She exhibits a depressed mood. She has a flat affect. Nursing note and vitals reviewed.         [address] Outpatient Visit on 04/12/2019   Component Date Value Ref Range Status    WBC 04/12/2019 5.3  4.6 - 13.2 K/uL Final    RBC 04/12/2019 4.79  4.20 - 5.30 M/uL Final    HGB 04/12/2019 14.0  12.0 - 16.0 g/dL Final    HCT 04/12/2019 43.4  35.0 - 45.0 % Final    MCV 04/12/2019 90.6  74.0 - 97.0 FL Final    MCH 04/12/2019 29.2  24.0 - 34.0 PG Final    MCHC 04/12/2019 32.3  31.0 - 37.0 g/dL Final    RDW 04/12/2019 13.4  11.6 - 14.5 % Final    PLATELET 00/31/6312 731  135 - 420 K/uL Final    MPV 04/12/2019 11.4  9.2 - 11.8 FL Final    NEUTROPHILS 04/12/2019 63  40 - 73 % Final    LYMPHOCYTES 04/12/2019 23  21 - 52 % Final    MONOCYTES 04/12/2019 9  3 - 10 % Final    EOSINOPHILS 04/12/2019 4  0 - 5 % Final    BASOPHILS 04/12/2019 1  0 - 2 % Final    ABS. NEUTROPHILS 04/12/2019 3.3  1.8 - 8.0 K/UL Final    ABS. LYMPHOCYTES 04/12/2019 1.2  0.9 - 3.6 K/UL Final    ABS. MONOCYTES 04/12/2019 0.5  0.05 - 1.2 K/UL Final    ABS. EOSINOPHILS 04/12/2019 0.2  0.0 - 0.4 K/UL Final    ABS. BASOPHILS 04/12/2019 0.0  0.0 - 0.1 K/UL Final    DF 04/12/2019 AUTOMATED    Final    Sodium 04/12/2019 141  136 - 145 mmol/L Final    Potassium 04/12/2019 4.4  3.5 - 5.5 mmol/L Final    Chloride 04/12/2019 105  100 - 108 mmol/L Final    CO2 04/12/2019 28  21 - 32 mmol/L Final    Anion gap 04/12/2019 8  3.0 - 18 mmol/L Final    Glucose 04/12/2019 86  74 - 99 mg/dL Final    BUN 04/12/2019 25* 7.0 - 18 MG/DL Final    Creatinine 04/12/2019 0.74  0.6 - 1.3 MG/DL Final    BUN/Creatinine ratio 04/12/2019 34* 12 - 20   Final    GFR est AA 04/12/2019 >60  >60 ml/min/1.73m2 Final    GFR est non-AA 04/12/2019 >60  >60 ml/min/1.73m2 Final    Calcium 04/12/2019 8.9  8.5 - 10.1 MG/DL Final    Bilirubin, total 04/12/2019 0.5  0.2 - 1.0 MG/DL Final    ALT (SGPT) 04/12/2019 29  13 - 56 U/L Final    AST (SGOT) 04/12/2019 20  15 - 37 U/L Final    Alk.  phosphatase 04/12/2019 86  45 - 117 U/L Final    Protein, total 04/12/2019 7.7  6.4 - 8.2 g/dL Final    Albumin 04/12/2019 4.3  3.4 - 5.0 g/dL Final    Globulin 04/12/2019 3.4  2.0 - 4.0 g/dL Final    A-G Ratio 04/12/2019 1.3  0.8 - 1.7   Final    LIPID PROFILE 04/12/2019        Final    Cholesterol, total 04/12/2019 245* <200 MG/DL Final    Triglyceride 04/12/2019 83  <150 MG/DL Final    HDL Cholesterol 04/12/2019 77* 40 - 60 MG/DL Final    LDL, calculated 04/12/2019 151.4* 0 - 100 MG/DL Final    VLDL, calculated 04/12/2019 16.6  MG/DL Final    CHOL/HDL Ratio 04/12/2019 3.2  0 - 5.0   Final    Vitamin D 25-Hydroxy 04/12/2019 21.7* 30 - 100 ng/mL Final         CT Results (most recent):  No results found for this or any previous visit. XR Results (most recent):  Results from Hospital Encounter encounter on 03/08/18   XR CHEST PA LAT    Narrative CHEST PA AND LATERAL:    CPT CODE: 37149    COMPARISON: none    INDICATION: History of prior abnormal chest x-ray. Cough. FINDINGS: Heart and mediastinum are normal. Lungs are clear with no infiltrates. No pleural effusions are seen. No significant osseous abnormalities are seen. Impression Impression:    I see no evidence of active pulmonary disease at this time. Comparison with the  prior abnormal chest x-ray is recommended if possible. CT   All Micro Results     None             DIAGNOSIS AND PLAN  Patient Active Problem List   Diagnosis Code    S/P hysterectomy Z90.710    Iron deficiency anemia D50.9    Fibroids D21.9    DJD (degenerative joint disease), cervical M47.812    Anxiety F41.9    Patient is W[employer] Z78.9    Palpitations R00.2    Endometrial hyperplasia N85.00    Vertigo R42    Tinnitus, left ear H93.12    Hyperlipidemia E78.5     1. Wellness examination  2. Colon cancer screening  Physical exam done, unremarkable.  reviewed, Due for colonoscopy, referral done and agreed by patient.   - REFERRAL TO GASTROENTEROLOGY    3. Anxiety and depression  Patient is under a lot of stress at home and at work. Sometimes feeling overwhelmed and unable to function optimally at work. - ALPRAZolam (XANAX) 0.25 mg tablet; Take 1 Tab by mouth daily as needed for Anxiety (for acute anxiety) for up to 3 doses. Dispense: 3 Tab; Refill: 0    4. Vitamin D deficiency  On recent labs. Start ergocalciferol    5. Hyperlipidemia, unspecified hyperlipidemia type  , Tchol: 245 (4/12/19).  Different treatment options discussed including medication and diet. Will start rosuvastatin low dose in addition to diet recommendations. Most common side effects discussed including muscle aches and liver disease manifesting with nausea, vomiting, abdominal pain, to seek immediate care and stop medication in such case. - rosuvastatin (CRESTOR) 10 mg tablet; Take 2 Tabs by mouth nightly. Dispense: 30 Tab; Refill: 3            Follow-up and Dispositions    · Return in about 6 weeks (around 6/17/2019). Christiana Manuel MD